# Patient Record
Sex: MALE | Race: WHITE | NOT HISPANIC OR LATINO | Employment: FULL TIME | URBAN - METROPOLITAN AREA
[De-identification: names, ages, dates, MRNs, and addresses within clinical notes are randomized per-mention and may not be internally consistent; named-entity substitution may affect disease eponyms.]

---

## 2020-06-09 DIAGNOSIS — R97.20 ELEVATED PROSTATE SPECIFIC ANTIGEN (PSA): Primary | ICD-10-CM

## 2020-06-11 ENCOUNTER — TELEPHONE (OUTPATIENT)
Dept: UROLOGY | Facility: CLINIC | Age: 65
End: 2020-06-11

## 2020-06-11 NOTE — TELEPHONE ENCOUNTER
----- Message from Janet Hoffmann sent at 6/11/2020  3:47 PM CDT -----  Contact: Patient   Patient has an appointment in the morning and need to speak to nurse regarding excuse for job. Call back number [phone]. Tks

## 2020-06-12 ENCOUNTER — OFFICE VISIT (OUTPATIENT)
Dept: UROLOGY | Facility: CLINIC | Age: 65
End: 2020-06-12
Payer: COMMERCIAL

## 2020-06-12 VITALS
HEIGHT: 70 IN | WEIGHT: 165 LBS | SYSTOLIC BLOOD PRESSURE: 167 MMHG | HEART RATE: 62 BPM | BODY MASS INDEX: 23.62 KG/M2 | RESPIRATION RATE: 18 BRPM | DIASTOLIC BLOOD PRESSURE: 72 MMHG

## 2020-06-12 DIAGNOSIS — R97.20 ELEVATED PROSTATE SPECIFIC ANTIGEN (PSA): Primary | ICD-10-CM

## 2020-06-12 LAB
BILIRUB SERPL-MCNC: NORMAL MG/DL
BLOOD URINE, POC: NORMAL
CLARITY, POC UA: CLEAR
COLOR, POC UA: YELLOW
GLUCOSE UR QL STRIP: NORMAL
KETONES UR QL STRIP: NORMAL
LEUKOCYTE ESTERASE URINE, POC: NORMAL
NITRITE, POC UA: NORMAL
PH, POC UA: 5.5
POC RESIDUAL URINE VOLUME: 10 ML (ref 0–100)
PROTEIN, POC: NORMAL
SPECIFIC GRAVITY, POC UA: 1.01
TESTOST SERPL-MCNC: 547 NG/DL (ref 193–740)
UROBILINOGEN, POC UA: 0.2

## 2020-06-12 PROCEDURE — 81002 URINALYSIS NONAUTO W/O SCOPE: CPT | Mod: S$GLB,,, | Performed by: UROLOGY

## 2020-06-12 PROCEDURE — 51798 US URINE CAPACITY MEASURE: CPT | Mod: S$GLB,,, | Performed by: UROLOGY

## 2020-06-12 PROCEDURE — 3008F PR BODY MASS INDEX (BMI) DOCUMENTED: ICD-10-PCS | Mod: CPTII,S$GLB,, | Performed by: UROLOGY

## 2020-06-12 PROCEDURE — 81002 POCT URINE DIPSTICK WITHOUT MICROSCOPE: ICD-10-PCS | Mod: S$GLB,,, | Performed by: UROLOGY

## 2020-06-12 PROCEDURE — 51798 POCT BLADDER SCAN: ICD-10-PCS | Mod: S$GLB,,, | Performed by: UROLOGY

## 2020-06-12 PROCEDURE — 99204 OFFICE O/P NEW MOD 45 MIN: CPT | Mod: 25,S$GLB,, | Performed by: UROLOGY

## 2020-06-12 PROCEDURE — 3008F BODY MASS INDEX DOCD: CPT | Mod: CPTII,S$GLB,, | Performed by: UROLOGY

## 2020-06-12 PROCEDURE — 99204 PR OFFICE/OUTPT VISIT, NEW, LEVL IV, 45-59 MIN: ICD-10-PCS | Mod: 25,S$GLB,, | Performed by: UROLOGY

## 2020-06-12 RX ORDER — ASPIRIN 81 MG/1
81 TABLET ORAL DAILY
COMMUNITY

## 2020-06-12 RX ORDER — LOSARTAN POTASSIUM AND HYDROCHLOROTHIAZIDE 12.5; 5 MG/1; MG/1
TABLET ORAL
COMMUNITY
End: 2023-06-27

## 2020-06-12 RX ORDER — BISOPROLOL FUMARATE 5 MG/1
TABLET, FILM COATED ORAL
COMMUNITY
Start: 2020-04-02

## 2020-06-12 RX ORDER — ATORVASTATIN CALCIUM 80 MG/1
TABLET, FILM COATED ORAL
COMMUNITY
Start: 2020-04-17

## 2020-06-12 RX ORDER — METFORMIN HYDROCHLORIDE 500 MG/1
TABLET, EXTENDED RELEASE ORAL
COMMUNITY
Start: 2020-04-03

## 2020-06-12 NOTE — PROGRESS NOTES
Subjective:       Patient ID: Jose Zuleta is a 65 y.o. male.    Chief Complaint: New Pt and Elevated PSA (PSA (05-21-20): 14.50)      HPI: 65-year-old male, former patient of Dr. Cote, last seen 3+ years ago.  Patient referred by Dr. Harris for an elevated PSA.  PSA was done on 05/21/2020 and was 14.50.  Patient states he has had an elevated PSA before.  States he had a biopsy in 2016 with Dr. Roldan at [facility].  Patient states that biopsy was negative.  Patient thinks his previous PSAs were 7.    Patient denies pain or burning urination.  States he has a good stream.  Denies difficulty voiding.  Denies blood in his urine.  Denies any significant weight loss.  Denies any bone pain.  Patient does state he will have rare occasions of nocturia.    No other urinary complaints.  All other health problems appear stable at this time.         Past Medical History:   Past Medical History:   Diagnosis Date    CAD (coronary artery disease)     Chronic pancreatitis     COPD (chronic obstructive pulmonary disease)     Diverticulosis 07/2015    DM (diabetes mellitus)     Elevated PSA     GERD (gastroesophageal reflux disease)     HTN (hypertension)     Hyperlipemia     MI (myocardial infarction)        Past Surgical Historical:   Past Surgical History:   Procedure Laterality Date    CHOLECYSTECTOMY      Heart Stents      x4    HERNIA REPAIR      PANCREAS SURGERY      PROSTATE BIOPSY  2016    SHOULDER SURGERY Left         Medications:   Medication List with Changes/Refills   Current Medications    ASPIRIN (ECOTRIN) 81 MG EC TABLET    Take 81 mg by mouth once daily.    ATORVASTATIN (LIPITOR) 80 MG TABLET        BISOPROLOL (ZEBETA) 5 MG TABLET        LOSARTAN-HYDROCHLOROTHIAZIDE 50-12.5 MG (HYZAAR) 50-12.5 MG PER TABLET    losartan 50 mg-hydrochlorothiazide 12.5 mg tablet    METFORMIN (GLUCOPHAGE-XR) 500 MG XR 24HR TABLET        OMEPRAZOLE MAGNESIUM (PRILOSEC ORAL)    Take by mouth.        Past Social History:    Social History     Socioeconomic History    Marital status: Single     Spouse name: Not on file    Number of children: Not on file    Years of education: Not on file    Highest education level: Not on file   Occupational History    Not on file   Social Needs    Financial resource strain: Not on file    Food insecurity:     Worry: Not on file     Inability: Not on file    Transportation needs:     Medical: Not on file     Non-medical: Not on file   Tobacco Use    Smoking status: Current Every Day Smoker   Substance and Sexual Activity    Alcohol use: Yes    Drug use: Not on file    Sexual activity: Not on file   Lifestyle    Physical activity:     Days per week: Not on file     Minutes per session: Not on file    Stress: Not on file   Relationships    Social connections:     Talks on phone: Not on file     Gets together: Not on file     Attends Christianity service: Not on file     Active member of club or organization: Not on file     Attends meetings of clubs or organizations: Not on file     Relationship status: Not on file   Other Topics Concern    Not on file   Social History Narrative    Not on file       Allergies: Review of patient's allergies indicates:  No Known Allergies     Family History:   Family History   Problem Relation Age of Onset    Coronary artery disease Other         Review of Systems:  Review of Systems   Constitutional: Negative for activity change and appetite change.   HENT: Negative for congestion and dental problem.    Eyes: Negative for visual disturbance.   Respiratory: Negative for chest tightness and shortness of breath.    Cardiovascular: Negative for chest pain.   Gastrointestinal: Negative for abdominal distention and abdominal pain.   Genitourinary: Negative for decreased urine volume, difficulty urinating, discharge, dysuria, enuresis, flank pain, frequency, genital sores, hematuria, penile pain, penile swelling, scrotal swelling, testicular pain and urgency.    Musculoskeletal: Negative for back pain and neck pain.   Skin: Negative for color change.   Neurological: Negative for dizziness.   Hematological: Negative for adenopathy.   Psychiatric/Behavioral: Negative for agitation, behavioral problems and confusion.       Physical Exam:  Physical Exam   Nursing note and vitals reviewed.  Constitutional: He is oriented to person, place, and time. He appears well-developed and well-nourished.   HENT:   Head: Normocephalic.   Eyes: Pupils are equal, round, and reactive to light.   Neck: Normal range of motion. Neck supple.   Cardiovascular: Normal rate, regular rhythm and normal heart sounds.    Pulmonary/Chest: Effort normal and breath sounds normal.   Abdominal: Soft. Bowel sounds are normal.   Genitourinary: Rectum normal, prostate normal and penis normal.   Genitourinary Comments: Prostate is benign.  Prostate is smooth with no nodules and nontender.   Musculoskeletal: Normal range of motion.   Neurological: He is alert and oriented to person, place, and time.   Skin: Skin is warm and dry.     Psychiatric: He has a normal mood and affect. His behavior is normal.     Urinalysis:  Glucose 500, otherwise normal.  Bladder scan:  10 cc    Assessment/Plan:   1.  Elevated PSA:  Recurrent check the patient's PSA and percent free.  Check his testosterone.  We will notify him of the results.  Will schedule patient for a prostate biopsy.    Follow-up to be arranged.    Problem List Items Addressed This Visit     None      Visit Diagnoses     Elevated prostate specific antigen (PSA)        Relevant Orders    POCT Bladder Scan (Completed)    PSA, total and free    POCT urine dipstick without microscope

## 2020-06-16 ENCOUNTER — CLINICAL SUPPORT (OUTPATIENT)
Dept: UROLOGY | Facility: CLINIC | Age: 65
End: 2020-06-16
Payer: COMMERCIAL

## 2020-06-16 DIAGNOSIS — E29.1 HYPOGONADISM IN MALE: Primary | ICD-10-CM

## 2020-06-16 NOTE — PROGRESS NOTES
Pt educated, consents signed. cipro 500 mg #8 given with instructions. Pt verbalized understanding.

## 2020-06-16 NOTE — LETTER
June 16, 2020      [facility]  [address] [address]  Phone: [phone]  Fax: [phone]       Patient: Jose Zuleta   YOB: 1955  Date of Visit: 06/16/2020    To Whom It May Concern:    Taiwo Zuleta  was at [facility] on 06/16/2020. HE may return to work  on 6/16/2020 WITH NO restrictions. If you have any questions or concerns, or if I can be of further assistance, please do not hesitate to contact me.    Sincerely,    David Hussein LPN

## 2020-06-16 NOTE — LETTER
June 16, 2020      [facility]  [address] [address]  Phone: [phone]  Fax: [phone]       Patient: Jose Zuleta   YOB: 1955  Date of Visit: 06/16/2020    To Whom It May Concern:    Taiwo Zuleta  was at [facility] on 06/30/2020. HE is having a procedure that will require him to take 4 days off after his procedure on 06/30/2020. He may return to work on 07/05/2020 with no restrictions. If you have any questions or concerns, or if I can be of further assistance, please do not hesitate to contact me.    Sincerely,    David Hussein LPN

## 2020-06-17 LAB
PROSTATE SPECIFIC ANTIGEN, TOTAL: 21.1 NG/ML
PSA FREE MFR SERPL: 4 % (CALC)
PSA FREE SERPL-MCNC: 0.8 NG/ML

## 2020-06-19 ENCOUNTER — TELEPHONE (OUTPATIENT)
Dept: UROLOGY | Facility: CLINIC | Age: 65
End: 2020-06-19

## 2020-06-19 NOTE — TELEPHONE ENCOUNTER
----- Message from Medinajaci Wise sent at 6/19/2020  2:48 PM CDT -----  Regarding: pt  Pt called and would like to consult a nurse regarding his doctor excuse for surgery. Pt would like for  EVA Tavarez to correct the dates. Please call back for [phone]

## 2020-06-30 ENCOUNTER — PROCEDURE VISIT (OUTPATIENT)
Dept: UROLOGY | Facility: CLINIC | Age: 65
End: 2020-06-30
Payer: COMMERCIAL

## 2020-06-30 VITALS
RESPIRATION RATE: 18 BRPM | OXYGEN SATURATION: 99 % | BODY MASS INDEX: 25.27 KG/M2 | WEIGHT: 161 LBS | HEART RATE: 64 BPM | DIASTOLIC BLOOD PRESSURE: 76 MMHG | HEIGHT: 67 IN | SYSTOLIC BLOOD PRESSURE: 181 MMHG

## 2020-06-30 DIAGNOSIS — R97.20 ELEVATED PROSTATE SPECIFIC ANTIGEN (PSA): ICD-10-CM

## 2020-06-30 PROCEDURE — 76872 US TRANSRECTAL: CPT | Mod: S$GLB,,, | Performed by: UROLOGY

## 2020-06-30 PROCEDURE — 55700 TRUS: CPT | Mod: S$GLB,,, | Performed by: UROLOGY

## 2020-06-30 PROCEDURE — 76872 TRUS: ICD-10-PCS | Mod: S$GLB,,, | Performed by: UROLOGY

## 2020-06-30 PROCEDURE — 96372 THER/PROPH/DIAG INJ SC/IM: CPT | Mod: 59,S$GLB,, | Performed by: UROLOGY

## 2020-06-30 PROCEDURE — 55700 TRUS: ICD-10-PCS | Mod: S$GLB,,, | Performed by: UROLOGY

## 2020-06-30 PROCEDURE — 96372 PR INJECTION,THERAP/PROPH/DIAG2ST, IM OR SUBCUT: ICD-10-PCS | Mod: 59,S$GLB,, | Performed by: UROLOGY

## 2020-06-30 RX ORDER — DIAZEPAM 10 MG/1
10 TABLET ORAL
Status: COMPLETED | OUTPATIENT
Start: 2020-06-30 | End: 2020-06-30

## 2020-06-30 RX ORDER — GENTAMICIN SULFATE 40 MG/ML
80 INJECTION, SOLUTION INTRAMUSCULAR; INTRAVENOUS
Status: COMPLETED | OUTPATIENT
Start: 2020-06-30 | End: 2020-06-30

## 2020-06-30 RX ADMIN — GENTAMICIN SULFATE 80 MG: 40 INJECTION, SOLUTION INTRAMUSCULAR; INTRAVENOUS at 10:06

## 2020-06-30 RX ADMIN — DIAZEPAM 10 MG: 10 TABLET ORAL at 10:06

## 2020-06-30 NOTE — PROCEDURES
"TRUS    Date/Time: 6/30/2020 10:00 AM  Performed by: Hugh Cote MD  Authorized by: Hugh Cote MD     Consent Done?:  Yes (Written)  Time out: Immediately prior to procedure a "time out" was called to verify the correct patient, procedure, equipment, support staff and site/side marked as required.    Indications: Elevated PSA    Preparation: Patient was prepped and draped in usual sterile fashion    Position:  Left lateral  Anesthesia:  10cc's 1% Lidocaine  Patient sedated: Yes    Sedation:  Other  Prostate Size:  45  Total Biopsies:  12    Patient tolerance:  Patient tolerated the procedure well with no immediate complications     Pt given 10 milligrams po valium.  Pt did have some central calcifications.  Fu next week      "

## 2020-06-30 NOTE — PATIENT INSTRUCTIONS
Prostate Needle Biopsy    Prostate needle biopsy is a test to look for prostate cancer. During the test, a thin, hollow needle is used to take small samples of tissue from the prostate. The samples are then tested in a lab.  Getting ready for the procedure  Prepare as you have been told. In addition to the following:  · Tell your healthcare provider about all medicines you take. This includes herbs and other supplements. It also includes any blood thinners, such as warfarin, clopidogrel, or daily aspirin. You may need to stop taking some or all of them before the procedure.  · You may be told to use a laxative, enemas, or both before the biopsy. This is to empty the colon and rectum of stool. Follow the instructions you are given.  · Your healthcare provider may prescribe antibiotics before the procedure. If so, take these as directed.  Risks and possible complications   All procedures have risks. The risks of this procedure include:  · Discomfort in the prostate area  · Infection in the urinary tract or prostate  · Infection in the bloodstream  · Rectal or urinary bleeding   The day of the procedure  The procedure is done in a healthcare providers office or a hospital. It takes about 45 minutes. You will be able to go home the same day. Transrectal ultrasound is often used during the procedure. This test uses sound waves to make images on a computer screen. The images help the healthcare provider insert the needle in the correct place. During the biopsy:  · If ultrasound will be used, you may be asked to drink water to fill your bladder.    · You may lie on your side on an exam table.   · The ultrasound transducer, which is about the size of a finger, is lubricated. It is then inserted into the rectum. This will feel like a prostate exam. The transducer is moved until the prostate can be seen in the ultrasound images.    · To numb the biopsy area, local anesthetics may be injected. You might also be given  medicine to make you sleepy.  · Using the ultrasound images as a guide, the biopsy needle is inserted. It may be inserted through the rectum or through the skin between the scrotum and the anus (perineum).  · The needle is used to take tissue samples from the prostate. About 12 samples are taken from different areas of the prostate. These samples are sent to a lab to be tested for cancer.  After the biopsy  At first you may feel a little lightheaded, especially if you had medicine to make you sleepy. You can lie on the table until you feel able to stand. You can go home once you are feeling better. You can go back to your normal activities. You may have some blood in your urine or stool that day. This is normal. You may also notice blood in your semen for weeks after the biopsy. This is normal and not dangerous. Your healthcare provider can tell you more about what to expect.  Follow-up care  You will see your healthcare provider for a follow-up visit. Depending on the biopsy results, you may be scheduled for more tests. If signs of cancer are found, you and your healthcare provider can discuss options for further testing.  When to call your healthcare provider  Call your healthcare provider if you have any of the following:  · Blood clots in your urine  · Bloody diarrhea  · Blood in the urine or stool that doesnt go away after 48 hours  · Chest pain or trouble breathing (call 911)  · Chills  · Feeling of weakness  · Fever of 100.4°F (38°C) or higher, or as directed by your healthcare provider  · Inability to urinate   Date Last Reviewed: 5/1/2017 © 2000-2017 [employer]. [address] [place] [id number]. All rights reserved. This information is not intended as a substitute for professional medical care. Always follow your healthcare professional's instructions.        Prostate Bx D/C  After hours or on weekends, you may reach a urology resident on call at this number: [phone].  NO  STRENUOUS ACTIVITY FOR 3 DAYS  NO SEXUAL INTERCOURSE FOR 7 DAYS  YOU MAY NOTICE BLOOD IN URINE OR SEMEN FOR SEVERAL DAYS

## 2020-07-10 ENCOUNTER — OFFICE VISIT (OUTPATIENT)
Dept: UROLOGY | Facility: CLINIC | Age: 65
End: 2020-07-10
Payer: COMMERCIAL

## 2020-07-10 VITALS
HEART RATE: 65 BPM | HEIGHT: 70 IN | BODY MASS INDEX: 23.62 KG/M2 | RESPIRATION RATE: 18 BRPM | SYSTOLIC BLOOD PRESSURE: 160 MMHG | DIASTOLIC BLOOD PRESSURE: 72 MMHG | WEIGHT: 165 LBS

## 2020-07-10 DIAGNOSIS — C61 PROSTATE CANCER: ICD-10-CM

## 2020-07-10 DIAGNOSIS — Z98.890 STATUS POST BIOPSY: Primary | ICD-10-CM

## 2020-07-10 LAB
BILIRUB UR QL STRIP: NEGATIVE
CLARITY, POC UA: ABNORMAL
COLOR, POC UA: ABNORMAL
GLUCOSE UR QL STRIP: POSITIVE
KETONES UR QL STRIP: NEGATIVE
LEUKOCYTE ESTERASE UR QL STRIP: NEGATIVE
NITRITE, POC UA: ABNORMAL
PH, POC UA: 6
POC AMORP, URINE: ABNORMAL
POC BACTI, URINE: ABNORMAL
POC BLOOD, URINE: POSITIVE
POC CASTS, URINE: ABNORMAL
POC CRYST, URINE: ABNORMAL
POC EPITH, URINE: ABNORMAL
POC HCG, URINE: ABNORMAL
POC HYALIN, URINE: ABNORMAL LPF
POC MUCUS, URINE: ABNORMAL
POC NITRATES, URINE: NEGATIVE
POC OTHER, URINE: ABNORMAL
POC RBC, URINE: ABNORMAL HPF
POC WBC, URINE: ABNORMAL HPF
PROT UR QL STRIP: NEGATIVE
SP GR UR STRIP: <=1.005 (ref 1–1.03)
UROBILINOGEN UR STRIP-ACNC: 0.2 (ref 0.3–2.2)

## 2020-07-10 PROCEDURE — 99213 OFFICE O/P EST LOW 20 MIN: CPT | Mod: S$GLB,,, | Performed by: NURSE PRACTITIONER

## 2020-07-10 PROCEDURE — 3008F BODY MASS INDEX DOCD: CPT | Mod: CPTII,S$GLB,, | Performed by: NURSE PRACTITIONER

## 2020-07-10 PROCEDURE — 3008F PR BODY MASS INDEX (BMI) DOCUMENTED: ICD-10-PCS | Mod: CPTII,S$GLB,, | Performed by: NURSE PRACTITIONER

## 2020-07-10 PROCEDURE — 99213 PR OFFICE/OUTPT VISIT, EST, LEVL III, 20-29 MIN: ICD-10-PCS | Mod: S$GLB,,, | Performed by: NURSE PRACTITIONER

## 2020-07-10 NOTE — PROGRESS NOTES
Subjective:       Patient ID: Jose Zuleta is a 65 y.o. male.    Chief Complaint: Prostate Bx f/u      HPI: 65-year-old male, patient Dr. Cote, presents for biopsy results.  Patient had a prostate biopsy on 06/30/2020, secondary to a PSA of 21.1.  ANA was benign.  Patient had 2/12 biopsies come back positive for adenocarcinoma.  1.  Lamar score 4+3=7, tumor size 8 mm involving 62% of the length of the biopsy.  2.  Lamar score 4+3=7, tumor size 3 mm involving 27% of the length of the biopsy.    Patient states he is doing well at this time.  Denies any pain or burning urination.  Denies any blood in his urine.  Denies any difficulty voiding.  Denies any perineal pain.  Denies any fever.    No other urinary complaints.  All other health problems appear stable at this time.       Past Medical History:   Past Medical History:   Diagnosis Date    CAD (coronary artery disease)     Chronic pancreatitis     COPD (chronic obstructive pulmonary disease)     Diverticulosis 07/2015    DM (diabetes mellitus)     Elevated PSA     GERD (gastroesophageal reflux disease)     HTN (hypertension)     Hyperlipemia     MI (myocardial infarction)        Past Surgical Historical:   Past Surgical History:   Procedure Laterality Date    CHOLECYSTECTOMY      Heart Stents      x4    HERNIA REPAIR      PANCREAS SURGERY      PROSTATE BIOPSY  2016    PROSTATE BIOPSY  06/30/2020    SHOULDER SURGERY Left         Medications:   Medication List with Changes/Refills   Current Medications    ASPIRIN (ECOTRIN) 81 MG EC TABLET    Take 81 mg by mouth once daily.    ATORVASTATIN (LIPITOR) 80 MG TABLET        BISOPROLOL (ZEBETA) 5 MG TABLET        LOSARTAN-HYDROCHLOROTHIAZIDE 50-12.5 MG (HYZAAR) 50-12.5 MG PER TABLET    losartan 50 mg-hydrochlorothiazide 12.5 mg tablet    METFORMIN (GLUCOPHAGE-XR) 500 MG XR 24HR TABLET        OMEPRAZOLE MAGNESIUM (PRILOSEC ORAL)    Take by mouth.        Past Social History:   Social History      Socioeconomic History    Marital status: Single     Spouse name: Not on file    Number of children: Not on file    Years of education: Not on file    Highest education level: Not on file   Occupational History    Not on file   Social Needs    Financial resource strain: Not on file    Food insecurity     Worry: Not on file     Inability: Not on file    Transportation needs     Medical: Not on file     Non-medical: Not on file   Tobacco Use    Smoking status: Current Every Day Smoker   Substance and Sexual Activity    Alcohol use: Yes    Drug use: Not on file    Sexual activity: Not on file   Lifestyle    Physical activity     Days per week: Not on file     Minutes per session: Not on file    Stress: Not on file   Relationships    Social connections     Talks on phone: Not on file     Gets together: Not on file     Attends Ep[employer]opalian service: Not on file     Active member of club or organization: Not on file     Attends meetings of clubs or organizations: Not on file     Relationship status: Not on file   Other Topics Concern    Not on file   Social History Narrative    Not on file       Allergies: Review of patient's allergies indicates:  No Known Allergies     Family History:   Family History   Problem Relation Age of Onset    Coronary artery disease Other         Review of Systems:  Review of Systems   Constitutional: Negative for activity change and appetite change.   HENT: Negative for congestion and dental problem.    Respiratory: Negative for chest tightness and shortness of breath.    Cardiovascular: Negative for chest pain.   Gastrointestinal: Negative for abdominal distention and abdominal pain.   Genitourinary: Negative for decreased urine volume, difficulty urinating, discharge, dysuria, enuresis, flank pain, frequency, genital sores, hematuria, penile pain, penile swelling, scrotal swelling, testicular pain and urgency.   Musculoskeletal: Negative for back pain and neck pain.    Neurological: Negative for dizziness.   Hematological: Negative for adenopathy.   Psychiatric/Behavioral: Negative for agitation, behavioral problems and confusion.       Physical Exam:  Physical Exam   Nursing note and vitals reviewed.  Constitutional: He is oriented to person, place, and time. He appears well-developed.   HENT:   Head: Normocephalic.   Cardiovascular: Normal rate, regular rhythm and normal heart sounds.    Pulmonary/Chest: Effort normal and breath sounds normal.   Abdominal: Soft. Bowel sounds are normal.   Neurological: He is alert and oriented to person, place, and time.   Skin: Skin is warm and dry.      Urinalysis:  Moderate blood, red blood cells 6-10.    Assessment/Plan:   Prostate cancer:  The patient newly diagnosed with prostate cancer.  Of explain results of the biopsy with the patient.  Patient stated understanding.    I briefly discussed options with the patient.  However, patient will follow-up with Dr. Bishop to further discuss options.    Patient is a little stressed with these results.  We will provide him a work excuse for the rest of the day.  Patient will return to work on Monday.    Patient will follow up with Dr. mcbride 1 a on 06/20/2020.  Follow-up sooner if needed.  Problem List Items Addressed This Visit        Oncology    Prostate cancer    Overview     Biopsy 06/30/2020 d/t PSA 21.1  2/12 +adenocarcinoma.  1 - GS4+3=7 8mm, 62%  2 - GS4+3=7 3mm, 27%           Current Assessment & Plan     Discussed results with patient and states understanding.  Will arrange consultation with Dr. Bishop to further discuss options.            Other Visit Diagnoses     Status post biopsy    -  Primary    Relevant Orders    POCT Urinalysis (w/Micro Option)

## 2020-07-10 NOTE — ASSESSMENT & PLAN NOTE
Discussed results with patient and states understanding.  Will arrange consultation with Dr. Bishop to further discuss options.

## 2020-07-10 NOTE — LETTER
July 10, 2020      [facility]  [address] [address]  Phone: [phone]  Fax: [phone]       Patient: Jose Zuleta   YOB: 1955  Date of Visit: 07/10/2020    To Whom It May Concern:    Taiwo Zuleta  was at [facility] on 07/10/2020. HE may return to work 07/13/20. If you have any questions or concerns, or if I can be of further assistance, please do not hesitate to contact me.    Sincerely,    Shefali Savage LPN

## 2020-07-20 ENCOUNTER — OFFICE VISIT (OUTPATIENT)
Dept: UROLOGY | Facility: CLINIC | Age: 65
End: 2020-07-20
Payer: COMMERCIAL

## 2020-07-20 VITALS
DIASTOLIC BLOOD PRESSURE: 77 MMHG | HEART RATE: 68 BPM | RESPIRATION RATE: 18 BRPM | SYSTOLIC BLOOD PRESSURE: 167 MMHG | OXYGEN SATURATION: 97 % | TEMPERATURE: 98 F

## 2020-07-20 DIAGNOSIS — C61 PROSTATE CANCER: Primary | ICD-10-CM

## 2020-07-20 PROCEDURE — 99214 PR OFFICE/OUTPT VISIT, EST, LEVL IV, 30-39 MIN: ICD-10-PCS | Mod: S$GLB,,, | Performed by: SPECIALIST

## 2020-07-20 PROCEDURE — 99214 OFFICE O/P EST MOD 30 MIN: CPT | Mod: S$GLB,,, | Performed by: SPECIALIST

## 2020-07-20 NOTE — PROGRESS NOTES
Subjective:       Patient ID: Jose Zuleta is a 65 y.o. male.    Chief Complaint: No chief complaint on file.      HPI:  65-year-old man referred to us by Dr. Hugh Cote for newly diagnosed prostate cancer.  He had undergone a needle biopsy of the prostate on 06/30/2020 which showed ISUP grade group 3 in 2 cores on the left side.  The left lateral base was a [place] 4+3=7 8 mm focus 62% of the core the left lateral mid was Harsh 4+3=7 3 mm focus 27% of the core.  He was sent to us for consideration for robotic prostatectomy.  The initial PSA was 21.1 ng/mL    He lost his wife about 27 years ago, but is able to have erections when he needs to.  He reports that he wakes up with erections in the morning.    Past Medical History:   Past Medical History:   Diagnosis Date    CAD (coronary artery disease)     Chronic pancreatitis     COPD (chronic obstructive pulmonary disease)     Diverticulosis 07/2015    DM (diabetes mellitus)     Elevated PSA     GERD (gastroesophageal reflux disease)     HTN (hypertension)     Hyperlipemia     MI (myocardial infarction)        Past Surgical Historical:   Past Surgical History:   Procedure Laterality Date    CHOLECYSTECTOMY      Heart Stents      x4    HERNIA REPAIR      PANCREAS SURGERY      PROSTATE BIOPSY  2016    PROSTATE BIOPSY  06/30/2020    SHOULDER SURGERY Left         Medications:   Medication List with Changes/Refills   Current Medications    ASPIRIN (ECOTRIN) 81 MG EC TABLET    Take 81 mg by mouth once daily.    ATORVASTATIN (LIPITOR) 80 MG TABLET        BISOPROLOL (ZEBETA) 5 MG TABLET        LOSARTAN-HYDROCHLOROTHIAZIDE 50-12.5 MG (HYZAAR) 50-12.5 MG PER TABLET    losartan 50 mg-hydrochlorothiazide 12.5 mg tablet    METFORMIN (GLUCOPHAGE-XR) 500 MG XR 24HR TABLET        OMEPRAZOLE MAGNESIUM (PRILOSEC ORAL)    Take by mouth.        Past Social History:   Social History     Socioeconomic History    Marital status: Single     Spouse name: Not on  file    Number of children: Not on file    Years of education: Not on file    Highest education level: Not on file   Occupational History    Not on file   Social Needs    Financial resource strain: Not on file    Food insecurity     Worry: Not on file     Inability: Not on file    Transportation needs     Medical: Not on file     Non-medical: Not on file   Tobacco Use    Smoking status: Current Every Day Smoker   Substance and Sexual Activity    Alcohol use: Yes    Drug use: Not on file    Sexual activity: Not on file   Lifestyle    Physical activity     Days per week: Not on file     Minutes per session: Not on file    Stress: Not on file   Relationships    Social connections     Talks on phone: Not on file     Gets together: Not on file     Attends Evangelical service: Not on file     Active member of club or organization: Not on file     Attends meetings of clubs or organizations: Not on file     Relationship status: Not on file   Other Topics Concern    Not on file   Social History Narrative    Not on file       Allergies: Review of patient's allergies indicates:  No Known Allergies     Family History:   Family History   Problem Relation Age of Onset    Coronary artery disease Other         Review of Systems:  Review of Systems - General ROS: negative  Psychological ROS: negative  Ophthalmic ROS: negative  ENT ROS: negative  Allergy and Immunology ROS: negative  Hematological and Lymphatic ROS: negative  Endocrine ROS: negative  Respiratory ROS: no cough, shortness of breath, or wheezing  Cardiovascular ROS: no chest pain or dyspnea on exertion  Gastrointestinal ROS: no abdominal pain, change in bowel habits, or black or bloody stools  Genito-Urinary ROS: positive for - newly diagnosed prostate cancer  Musculoskeletal ROS: negative  Neurological ROS: no TIA or stroke symptoms  Dermatological ROS: negative     Physical Exam:  General Appearance:    Alert, cooperative, no distress, appears stated  age   Head:    Normocephalic, without obvious abnormality, atraumatic   Eyes:    PERRL, conjunctiva/corneas clear, EOM's intact, fundi     benign, both eyes        Ears:    Normal TM's and external ear canals, both ears   Nose:   Nares normal, septum midline, mucosa normal, no drainage    or sinus tenderness   Throat:   Lips, mucosa, and tongue normal; teeth and gums normal   Neck:   Supple, symmetrical, trachea midline, no adenopathy;        thyroid:  No enlargement/tenderness/nodules; no carotid    bruit or JVD   Back:     Symmetric, no curvature, ROM normal, no CVA tenderness   Lungs:     Clear to auscultation bilaterally, respirations unlabored   Chest wall:    No tenderness or deformity   Heart:    Regular rate and rhythm, S1 and S2 normal, no murmur, rub   or gallop   Abdomen:     Soft, non-tender, bowel sounds active all four quadrants,     no masses, no organomegaly   Genitalia:    Deferred   Rectal:    Deferred   Extremities:   Extremities normal, atraumatic, no cyanosis or edema   Pulses:   2+ and symmetric all extremities   Skin:   Skin color, texture, turgor normal, no rashes or lesions   Lymph nodes:   Cervical, supraclavicular, and axillary nodes normal   Neurologic:   CNII-XII intact. Normal strength, sensation and reflexes       throughout       Assessment/Plan:       65-year-old man with newly diagnosed ISUP grade group 3 adenocarcinoma of the prostate in 2 cores significant volume disease.    1. We talked about the natural progression of prostate cancer today.  We talked about treatment modalities for prostate cancer.  Initially we explained the concept of active surveillance and what is entailed in terms of follow-up regimen and the factors that would trigger moving on to treatment.  Based on patient's risk stratification, and he severely elevated PSA I will not recommend active surveillance.  Then we discussed treatment with curative intent including radiotherapy in a variety of external beam  versus brachytherapy.  Then we talked about robotic prostatectomy as the standard of care for surgical therapy.  There was brief mention of high intensity focused ultrasound as well as cryotherapy.  At this point patient is leaning more to was a treatment modality with curative intent either radiation or prostatectomy.  He is leaning towards robotic prostatectomy but patient will definitely discussed with his family and then give me a call with his final decision.  2.  Patient has not had any additional metastatic evaluation.  Given that his initial PSA was 21.1 ng/mL he will need a bone scan and a CT scan of the abdomen and pelvis for complete evaluation.      Problem List Items Addressed This Visit        Oncology    Prostate cancer - Primary    Overview     Biopsy 06/30/2020 d/t PSA 21.1  2/12 +adenocarcinoma.  1 - GS4+3=7 8mm, 62%  2 - GS4+3=7 3mm, 27%

## 2020-07-20 NOTE — PROGRESS NOTES
Pt without complaints at this time. Consult appt per JJJ. Elevated PSA positive biopsy for cancer.

## 2020-07-26 DIAGNOSIS — Z01.818 PREOPERATIVE TESTING: Primary | ICD-10-CM

## 2020-07-27 ENCOUNTER — TELEPHONE (OUTPATIENT)
Dept: UROLOGY | Facility: CLINIC | Age: 65
End: 2020-07-27

## 2020-07-27 DIAGNOSIS — C61 PROSTATE CANCER: Primary | ICD-10-CM

## 2020-07-27 LAB
ANION GAP SERPL CALC-SCNC: 9 MMOL/L (ref 8–17)
BUN/CREAT SERPL: 9 (ref 6–20)
CALCIUM SERPL-MCNC: 9 MG/DL (ref 8.6–10.2)
CARBON DIOXIDE, CO2: 30 MMOL/L (ref 22–29)
CHLORIDE: 88 MMOL/L (ref 98–107)
CREAT SERPL-MCNC: 0.8 MG/DL (ref 0.7–1.2)
GFR ESTIMATION: 97.64
GLUCOSE: 139 MG/DL (ref 82–115)
POTASSIUM: 3.4 MMOL/L (ref 3.5–5.1)
SODIUM: 127 MMOL/L (ref 136–145)
UREA NITROGEN (BUN): 7.2 MG/DL (ref 8–23)

## 2020-07-27 NOTE — TELEPHONE ENCOUNTER
Please communicate the following information to Ms. annita Zuleta     1.  He needs to go to the path lab on Monday 07/27/2020 and get a BMP drawn.   2.  I am going to review the BMP and then I am going to order a CT scan of the abdomen and pelvis as well as a bone scan.  We need to stage him appropriately based on the risk stratification of his cancer   3.  After we reviewed all the studies that we going to pick a date for him for his surgery.  If all goes well with plan to put him on the schedule for mid August 2020       Updated patient on POC above, verbalized understanding

## 2020-07-27 NOTE — TELEPHONE ENCOUNTER
----- Message from Janet Hoffmann sent at 7/24/2020  4:42 PM CDT -----  patient need to speak to nurse regarding scheduling surgery. Call back number [phone]. Luc

## 2020-07-28 ENCOUNTER — TELEPHONE (OUTPATIENT)
Dept: UROLOGY | Facility: CLINIC | Age: 65
End: 2020-07-28

## 2020-07-28 NOTE — TELEPHONE ENCOUNTER
Pt contacted clinic, nurse advised pt about BMP is good and that STEVEN is going to order the CT scan and bone scan, pt verbalized understanding. NB

## 2020-07-28 NOTE — TELEPHONE ENCOUNTER
Attempted to contact pt. Left voicemail, adv'd pt that the BMP has been reviewed & renal function is very good. Also adv'd that Dr Bishop will order CT abdomen and pelvis as well as a bone scan.    ABW     ----- Message from Mohamud Bishop MD sent at 7/27/2020  6:46 PM CDT -----  Inform him that his BMP has been reviewed.  His renal function is very good.  I am going to go ahead and order a CT abdomen and pelvis as well as a bone scan.  I would like him to do these as soon as possible.

## 2020-08-12 DIAGNOSIS — C61 PROSTATE CANCER: Primary | ICD-10-CM

## 2020-08-13 ENCOUNTER — OFFICE VISIT (OUTPATIENT)
Dept: UROLOGY | Facility: CLINIC | Age: 65
End: 2020-08-13
Payer: COMMERCIAL

## 2020-08-13 VITALS
HEIGHT: 70 IN | BODY MASS INDEX: 23.62 KG/M2 | SYSTOLIC BLOOD PRESSURE: 194 MMHG | DIASTOLIC BLOOD PRESSURE: 88 MMHG | HEART RATE: 69 BPM | WEIGHT: 165 LBS

## 2020-08-13 DIAGNOSIS — C61 PROSTATE CANCER: Primary | ICD-10-CM

## 2020-08-13 PROCEDURE — 99213 PR OFFICE/OUTPT VISIT, EST, LEVL III, 20-29 MIN: ICD-10-PCS | Mod: S$GLB,,, | Performed by: SPECIALIST

## 2020-08-13 PROCEDURE — 3008F BODY MASS INDEX DOCD: CPT | Mod: CPTII,S$GLB,, | Performed by: SPECIALIST

## 2020-08-13 PROCEDURE — 3008F PR BODY MASS INDEX (BMI) DOCUMENTED: ICD-10-PCS | Mod: CPTII,S$GLB,, | Performed by: SPECIALIST

## 2020-08-13 PROCEDURE — 99213 OFFICE O/P EST LOW 20 MIN: CPT | Mod: S$GLB,,, | Performed by: SPECIALIST

## 2020-08-13 RX ORDER — AMOXICILLIN 875 MG/1
TABLET, FILM COATED ORAL
COMMUNITY
End: 2022-03-15

## 2020-08-13 NOTE — PROGRESS NOTES
Subjective:       Patient ID: Jose Zuleta is a 65 y.o. male.    Chief Complaint: Follow-up      HPI: 65-year-old man referred to us by Dr. Hugh Cote for newly diagnosed prostate cancer.  He had undergone a needle biopsy of the prostate on 06/30/2020 which showed ISUP grade group 3 in 2 cores on the left side.  The left lateral base was a Harsh 4+3=7 8 mm focus 62% of the core the left lateral mid was Harsh 4+3=7 3 mm focus 27% of the core.  He was sent to us for consideration for robotic prostatectomy.  The initial PSA was 21.1 ng/mL    We sent for metastatic evaluation.  CT scan abdomen and pelvis was negative.  Bone scan was suspicious for uptake on the left frontal bone.  He is here today to be set up for skull x-rays confirmed of text for this suspicious area of uptake on the bone scan.  He denies constitutional symptoms or weight loss.    Past Medical History:   Past Medical History:   Diagnosis Date    CAD (coronary artery disease)     Chronic pancreatitis     COPD (chronic obstructive pulmonary disease)     Diverticulosis 07/2015    DM (diabetes mellitus)     Elevated PSA     GERD (gastroesophageal reflux disease)     HTN (hypertension)     Hyperlipemia     MI (myocardial infarction)        Past Surgical Historical:   Past Surgical History:   Procedure Laterality Date    CHOLECYSTECTOMY      Heart Stents      x4    HERNIA REPAIR      PANCREAS SURGERY      PROSTATE BIOPSY  2016    PROSTATE BIOPSY  06/30/2020    SHOULDER SURGERY Left         Medications:   Medication List with Changes/Refills   Current Medications    AMOXICILLIN (AMOXIL) 875 MG TABLET    amoxicillin 875 mg tablet    ASPIRIN (ECOTRIN) 81 MG EC TABLET    Take 81 mg by mouth once daily.    ATORVASTATIN (LIPITOR) 80 MG TABLET        BISOPROLOL (ZEBETA) 5 MG TABLET        LOSARTAN-HYDROCHLOROTHIAZIDE 50-12.5 MG (HYZAAR) 50-12.5 MG PER TABLET    losartan 50 mg-hydrochlorothiazide 12.5 mg tablet    METFORMIN  (GLUCOPHAGE-XR) 500 MG XR 24HR TABLET        OMEPRAZOLE MAGNESIUM (PRILOSEC ORAL)    Take by mouth.        Past Social History:   Social History     Socioeconomic History    Marital status: Single     Spouse name: Not on file    Number of children: Not on file    Years of education: Not on file    Highest education level: Not on file   Occupational History    Not on file   Social Needs    Financial resource strain: Not on file    Food insecurity     Worry: Not on file     Inability: Not on file    Transportation needs     Medical: Not on file     Non-medical: Not on file   Tobacco Use    Smoking status: Current Every Day Smoker   Substance and Sexual Activity    Alcohol use: Yes    Drug use: Not on file    Sexual activity: Not on file   Lifestyle    Physical activity     Days per week: Not on file     Minutes per session: Not on file    Stress: Not on file   Relationships    Social connections     Talks on phone: Not on file     Gets together: Not on file     Attends Episcopal service: Not on file     Active member of club or organization: Not on file     Attends meetings of clubs or organizations: Not on file     Relationship status: Not on file   Other Topics Concern    Not on file   Social History Narrative    Not on file       Allergies: Review of patient's allergies indicates:  No Known Allergies     Family History:   Family History   Problem Relation Age of Onset    Coronary artery disease Other         Review of Systems:   systems reviewed and notable for prostate cancer  All other systems were reviewed Neg except as stated in the HPI    Physical Exam:  General: A&Ox3. No apparent distress. No deformities.  Neck: No masses. Normal thyroid.  Lungs: normal inspiration. No use of accessory muscles.  Heart: normal pulse. No arrhythmias.  Abdomen: Soft. NT. ND. No masses. No hernias. No hepatosplenomegaly.  Lymphatic: Neck and groin nodes negative.  Skin: The skin is warm and dry. No  jaundice.  Neurology: Cranial nerves 2-12 crossly intact, no focal weaknesses, no sensation deficits, no motor deficits  Ext: No clubbing, cyanosis or edema.  :  Deferred      Assessment/Plan:       65-year-old man with adenocarcinoma of the prostate ISUP grade group 3 in 2 cores all in the left lateral side with initial presenting PSA of 21.1 ng/mL.    1.  Bone scan was concerning for uptake in the left frontal bone.  We ordered an x-ray of this call four views limited today.  Patient will perform these films today.  2.  He is already on the schedule for robotic prostatectomy in the next 1-2 weeks.    Problem List Items Addressed This Visit        Oncology    Prostate cancer - Primary    Overview     Biopsy 06/30/2020 d/t PSA 21.1  2/12 +adenocarcinoma.  1 - GS4+3=7 8mm, 62%  2 - GS4+3=7 3mm, 27%           Relevant Orders    X-Ray Skull Ltd Less Than 4 Views (Completed)

## 2020-08-18 ENCOUNTER — TELEPHONE (OUTPATIENT)
Dept: UROLOGY | Facility: CLINIC | Age: 65
End: 2020-08-18

## 2020-08-18 NOTE — TELEPHONE ENCOUNTER
Pt contacted clinic and advised that he would not be cleared for surgery on 8/19/20. Pt states that he has to be cleared from cardiologist per Dr. Harris and he is cancelling his surgery. MOLLY Barragan's office contacted clinic and advised that it has been since 2017 that pt was seen and Dr. Barragan is not in clinic today to make a decision. MOLLY Harris's opffice states that he would not clear pt for surgery and prefers for pt to be cleared by cardiologist since pt has not been seen since having his stent's placed. Nurse verbalized understanding ans asked to fax office notes to clinic. MOLLY HARRIS to be notified of status.  and surgery prsonnel aware of cancellation. NB

## 2020-08-20 ENCOUNTER — TELEPHONE (OUTPATIENT)
Dept: UROLOGY | Facility: CLINIC | Age: 65
End: 2020-08-20

## 2020-08-20 NOTE — TELEPHONE ENCOUNTER
spoke with pt who stated he has a possible appt with Dr Barragan on Monday. instructed pt to bring his cardiac clearence here to the office.pt verbalized understanding.      By David Hussein LPN

## 2020-08-20 NOTE — TELEPHONE ENCOUNTER
----- Message from Janet Hoffmann sent at 8/19/2020  4:32 PM CDT -----  #patient need to speak to nurse before his surgery. Call back number [phone]. Luc

## 2020-09-09 ENCOUNTER — TELEPHONE (OUTPATIENT)
Dept: UROLOGY | Facility: CLINIC | Age: 65
End: 2020-09-09

## 2020-09-09 NOTE — TELEPHONE ENCOUNTER
Spoke with pt he states that he has not had his stress test and was inquiring about what his next steps would be, nurse advised that as of right now all procedures are on hold until further notice and once the clinic has been notified that we will reach out to him with an update status, pt verbalized understanding. NB        ----- Message from Ly Hunt sent at 9/2/2020  2:24 PM CDT -----  Regarding: pt. advice  Contact: self  Type:  Needs Medical Advice    Who Called: pt  Would the patient rather a call back or a response via [facility]? Call back  Best Call Back Number: [phone]  Additional Information: pt. Called to know when will he have a stress test before his procedure and/or if the procedure is rescheduled or will be. Pt. Really need to speak with staff regarding this

## 2020-09-25 ENCOUNTER — TELEPHONE (OUTPATIENT)
Dept: UROLOGY | Facility: CLINIC | Age: 65
End: 2020-09-25

## 2020-09-25 NOTE — TELEPHONE ENCOUNTER
Contacted pt advised him that he need to get with Cardiologist to have his Nuclear Stress Test done before proceeding with procedure. Pt verbalized understanding. BJP      ----- Message from Medina Wise sent at 9/24/2020  3:44 PM CDT -----  Regarding: pt  Pt would like to speak with Yanna. Pt has some concerns about the the surgery. Please call back at [phone]

## 2020-10-29 ENCOUNTER — TELEPHONE (OUTPATIENT)
Dept: UROLOGY | Facility: CLINIC | Age: 65
End: 2020-10-29

## 2020-10-29 NOTE — TELEPHONE ENCOUNTER
Spoke with pt about his education needed to be scheduled, he inquired about why would he need to have a repeat of labs done if he has already had them done previously, nurse advised that the results are only good for 30 days prior to having a procedure, pt inquired if he would have certain restrictions after his surgery and STEVEN spoke with him about this, pt verbalized understanding. NB

## 2020-11-17 ENCOUNTER — CLINICAL SUPPORT (OUTPATIENT)
Dept: UROLOGY | Facility: CLINIC | Age: 65
End: 2020-11-17
Payer: COMMERCIAL

## 2020-11-17 DIAGNOSIS — C61 PROSTATE CANCER: Primary | ICD-10-CM

## 2020-11-17 NOTE — PROGRESS NOTES
Pt in clinic today for pre-admission paperwork for RARP/BPLND/RNS, informed pt that consents will be signed at hospital day of procedure. Short/same day papers given, clearance has been sent, advised pt that [facility] will contact pt the day before surgery with the time to be at the hospital, pt informed of driving restrictions, lifting restrictions of nothing over 10 lbs, possibility of catheter placement, and when RTC appt will be scheduled, pt verbalized understanding. NB

## 2020-11-18 LAB
ANION GAP SERPL CALC-SCNC: 5 MMOL/L (ref 3–11)
APTT PPP: 30.7 SEC (ref 19.6–32.4)
BASOPHILS NFR BLD: 1.1 % (ref 0–3)
BUN SERPL-MCNC: 8 MG/DL (ref 7–18)
BUN/CREAT SERPL: 8.88 RATIO (ref 7–18)
CALCIUM BLD-MCNC: NEGATIVE MG/DL
CALCIUM SERPL-MCNC: 9.3 MG/DL (ref 8.8–10.5)
CHLORIDE SERPL-SCNC: 90 MMOL/L (ref 100–108)
CO2 SERPL-SCNC: 33 MMOL/L (ref 21–32)
COVID-19 AB, IGM: NEGATIVE
CREAT SERPL-MCNC: 0.9 MG/DL (ref 0.7–1.3)
EOSINOPHIL NFR BLD: 5.3 % (ref 1–3)
ERYTHROCYTE [DISTWIDTH] IN BLOOD BY AUTOMATED COUNT: 13.1 % (ref 12.5–18)
GFR ESTIMATION: > 60
GLUCOSE SERPL-MCNC: 159 MG/DL (ref 70–110)
HCT VFR BLD AUTO: 43.8 % (ref 42–52)
HGB BLD-MCNC: 15.5 G/DL (ref 14–18)
INR PPP: 1 INR (ref 0.9–1.1)
LYMPHOCYTES NFR BLD: 22.8 % (ref 25–40)
MCH RBC QN AUTO: 30.6 PG (ref 27–31.2)
MCHC RBC AUTO-ENTMCNC: 35.4 G/DL (ref 31.8–35.4)
MCV RBC AUTO: 86.4 FL (ref 80–97)
MONOCYTES NFR BLD: 9 % (ref 1–15)
NEUTROPHILS # BLD AUTO: 4.66 10*3/UL (ref 1.8–7.7)
NEUTROPHILS NFR BLD: 61.4 % (ref 37–80)
NUCLEATED RED BLOOD CELLS: 0 %
PATIENT EMPLOYED IN HEALTHCARE: NO
PATIENT HAS SYMPTOMS FOR CONDITION OF INTEREST: NO
PATIENT HOSPITALIZED BC COND: NO
PATIENT IN CONGREGATE CARE: NO
PLATELETS: 220 10*3/UL (ref 142–424)
POTASSIUM SERPL-SCNC: 3.4 MMOL/L (ref 3.6–5.2)
PROTHROMBIN TIME: 12 SEC (ref 10.6–13)
RBC # BLD AUTO: 5.07 10*6/UL (ref 4.7–6.1)
SODIUM BLD-SCNC: 128 MMOL/L (ref 135–145)
WBC # BLD: 7.6 10*3/UL (ref 4.6–10.2)

## 2020-11-19 ENCOUNTER — TELEPHONE (OUTPATIENT)
Dept: UROLOGY | Facility: CLINIC | Age: 65
End: 2020-11-19

## 2020-11-19 NOTE — TELEPHONE ENCOUNTER
Spoke with authorization dept and she was inquiring about CPT codes for procedure, nurse confirmed CPT codes. NB      ----- Message from Janet Hoffmann sent at 11/19/2020 12:33 PM CST -----  [facility] need to speak to nurse regarding patient authorization. Call back number [phone]. Jerzys

## 2020-11-24 ENCOUNTER — OUTSIDE PLACE OF SERVICE (OUTPATIENT)
Dept: UROLOGY | Facility: CLINIC | Age: 65
End: 2020-11-24
Payer: COMMERCIAL

## 2020-11-24 LAB
GLUCOSE SERPL-MCNC: 127 MG/DL (ref 70–105)
GLUCOSE SERPL-MCNC: 150 MG/DL (ref 70–105)
GLUCOSE SERPL-MCNC: 179 MG/DL (ref 70–105)

## 2020-11-24 PROCEDURE — 38571 PR LAP,PELVIC LYMPHADENECTOMY: ICD-10-PCS | Mod: 51,,, | Performed by: SPECIALIST

## 2020-11-24 PROCEDURE — 38571 LAPAROSCOPY LYMPHADENECTOMY: CPT | Mod: 51,,, | Performed by: SPECIALIST

## 2020-11-24 PROCEDURE — 55866 PR LAP,PROSTATECTOMY,RADICAL,W/NERVE SPARE,INCL ROBOTIC: ICD-10-PCS | Mod: ,,, | Performed by: SPECIALIST

## 2020-11-24 PROCEDURE — 55866 LAPS SURG PRST8ECT RPBIC RAD: CPT | Mod: ,,, | Performed by: SPECIALIST

## 2020-11-25 LAB
ANION GAP SERPL CALC-SCNC: 9 MMOL/L (ref 3–11)
BUN SERPL-MCNC: 17 MG/DL (ref 7–18)
BUN/CREAT SERPL: 12.68 RATIO (ref 7–18)
CALCIUM SERPL-MCNC: 7.5 MG/DL (ref 8.8–10.5)
CHLORIDE SERPL-SCNC: 90 MMOL/L (ref 100–108)
CO2 SERPL-SCNC: 27 MMOL/L (ref 21–32)
CREAT SERPL-MCNC: 1.34 MG/DL (ref 0.7–1.3)
ERYTHROCYTE [DISTWIDTH] IN BLOOD BY AUTOMATED COUNT: 13.2 % (ref 12.5–18)
GFR ESTIMATION: 54
GLUCOSE SERPL-MCNC: 156 MG/DL (ref 70–105)
GLUCOSE SERPL-MCNC: 225 MG/DL (ref 70–110)
HCT VFR BLD AUTO: 29.4 % (ref 42–52)
HGB BLD-MCNC: 10.1 G/DL (ref 14–18)
MCH RBC QN AUTO: 30.7 PG (ref 27–31.2)
MCHC RBC AUTO-ENTMCNC: 34.4 G/DL (ref 31.8–35.4)
MCV RBC AUTO: 89.4 FL (ref 80–97)
NUCLEATED RED BLOOD CELLS: 0 %
PLATELETS: 176 10*3/UL (ref 142–424)
POTASSIUM SERPL-SCNC: 3.7 MMOL/L (ref 3.6–5.2)
RBC # BLD AUTO: 3.29 10*6/UL (ref 4.7–6.1)
SODIUM BLD-SCNC: 126 MMOL/L (ref 135–145)
WBC # BLD: 10.4 10*3/UL (ref 4.6–10.2)

## 2020-11-27 DIAGNOSIS — Z90.79 STATUS POST PROSTATECTOMY: Primary | ICD-10-CM

## 2020-12-01 ENCOUNTER — TELEPHONE (OUTPATIENT)
Dept: UROLOGY | Facility: CLINIC | Age: 65
End: 2020-12-01

## 2020-12-01 LAB
SPECIMEN TO PATHOLOGY: 0
SPECIMEN TO PATHOLOGY: 0
SPECIMEN TO PATHOLOGY: 1
SPECIMEN TO PATHOLOGY: 2
SPECIMEN TO PATHOLOGY: 3
SPECIMEN TO PATHOLOGY: NORMAL
SPECIMEN TO PATHOLOGY: PRESENT

## 2020-12-01 NOTE — TELEPHONE ENCOUNTER
Pt contacted clinic and was stating that he had read on his d/c paperwork that the catheter will be removed in 7-10 days and as of today it is 9 days, nurse informed pt that upon educating pt was to keep catheter in place until his return appt date which will be provided by STEVEN, pt states that he  Was not told that, he inquired about why did the hospital try to remove his drain tube, nurse verbalized that she had no idea as to the tube being removed, but nurse did verbalize that she educated him not only in person but also on the phone as well as STEVEN informing pt of the protocols that he uses with this type of procedure, pt states that he also needs paperwork filled out regarding his leave of absence, nurse verbalized that we would be able to fill out the paperwork for him, pt states that he feels out of the network of his care and that no one has called to check up on him since his surgery because he stays by himself, nurse verbalized understanding and advised him that she would inform STEVEN of his concerns, pt verbalized understanding. NB       ----- Message from Parmjit Cheatham sent at 11/30/2020  3:34 PM CST -----  Regarding: Barragan catheter issue  Please call Jose to discuss his catheter being in for 7 days [phone] (home).

## 2020-12-03 ENCOUNTER — TELEPHONE (OUTPATIENT)
Dept: UROLOGY | Facility: CLINIC | Age: 65
End: 2020-12-03

## 2020-12-03 NOTE — TELEPHONE ENCOUNTER
COVID F/U. No signs of covid. BJP    ----- Message from Moriah Waters sent at 11/30/2020 11:53 AM CST -----  Regarding: COVID FU  SURGERY-11/24/2020

## 2020-12-07 ENCOUNTER — OFFICE VISIT (OUTPATIENT)
Dept: UROLOGY | Facility: CLINIC | Age: 65
End: 2020-12-07
Payer: COMMERCIAL

## 2020-12-07 ENCOUNTER — HOSPITAL ENCOUNTER (OUTPATIENT)
Dept: RADIOLOGY | Facility: CLINIC | Age: 65
Discharge: HOME | End: 2020-12-07
Attending: NURSE PRACTITIONER
Payer: COMMERCIAL

## 2020-12-07 VITALS
HEIGHT: 70 IN | DIASTOLIC BLOOD PRESSURE: 70 MMHG | WEIGHT: 161 LBS | BODY MASS INDEX: 23.05 KG/M2 | SYSTOLIC BLOOD PRESSURE: 146 MMHG | HEART RATE: 82 BPM

## 2020-12-07 DIAGNOSIS — C61 PROSTATE CANCER: ICD-10-CM

## 2020-12-07 DIAGNOSIS — Z97.8 FOLEY CATHETER IN PLACE: ICD-10-CM

## 2020-12-07 DIAGNOSIS — Z90.79 STATUS POST PROSTATECTOMY: Primary | ICD-10-CM

## 2020-12-07 DIAGNOSIS — Z90.79 S/P PROSTATECTOMY: ICD-10-CM

## 2020-12-07 PROCEDURE — 3008F PR BODY MASS INDEX (BMI) DOCUMENTED: ICD-10-PCS | Mod: CPTII,S$GLB,, | Performed by: SPECIALIST

## 2020-12-07 PROCEDURE — 3008F BODY MASS INDEX DOCD: CPT | Mod: CPTII,S$GLB,, | Performed by: SPECIALIST

## 2020-12-07 PROCEDURE — 99024 POSTOP FOLLOW-UP VISIT: CPT | Mod: S$GLB,,, | Performed by: SPECIALIST

## 2020-12-07 PROCEDURE — 99024 PR POST-OP FOLLOW-UP VISIT: ICD-10-PCS | Mod: S$GLB,,, | Performed by: SPECIALIST

## 2020-12-07 RX ORDER — LANCETS
EACH MISCELLANEOUS
COMMUNITY
Start: 2020-12-04

## 2020-12-07 RX ORDER — HYDROCODONE BITARTRATE AND ACETAMINOPHEN 5; 325 MG/1; MG/1
TABLET ORAL
COMMUNITY
Start: 2020-11-27

## 2020-12-07 RX ORDER — SULFAMETHOXAZOLE AND TRIMETHOPRIM 800; 160 MG/1; MG/1
TABLET ORAL
COMMUNITY
Start: 2020-11-27 | End: 2022-03-15

## 2020-12-07 RX ORDER — HYOSCYAMINE SULFATE 0.12 MG/1
TABLET SUBLINGUAL
COMMUNITY
Start: 2020-11-27

## 2020-12-07 NOTE — PROGRESS NOTES
Pt presented to clinic for 16Fr catheter removal. 50Ml clear yellow urine in in catheter bag. 10cc catheter balloon deflated, catheter removed, balloon intact, patient tolerated well, no complaints voiced.

## 2020-12-07 NOTE — PROGRESS NOTES
Subjective:       Patient ID: Jose Zuleta is a 65 y.o. male.    Chief Complaint: Post-op Evaluation (cystogram)      HPI:  65-year-old man with localized prostate cancer who underwent a robotic prostatectomy with bilateral pelvic lymph node dissection 11/24/2020.    Final pathology showed negative anterior prostatic fat, 2 left-sided pelvic lymph nodes negative, 1 right-sided pelvic lymph node negative, prostatic adenocarcinoma Harsh 4+3=7 with tumor confined to the prostate prostate weight was 42 g with less than 10% involving tumor.  Surgical margins were negative.  There was no seminal vesicle invasion, no extraprostatic extension, but perineural invasion was reported.    He is here today for consideration for catheter removal as well as for drain removal.    Past Medical History:   Past Medical History:   Diagnosis Date    CAD (coronary artery disease)     Chronic pancreatitis     COPD (chronic obstructive pulmonary disease)     Diverticulosis 07/2015    DM (diabetes mellitus)     Elevated PSA     GERD (gastroesophageal reflux disease)     HTN (hypertension)     Hyperlipemia     MI (myocardial infarction)        Past Surgical Historical:   Past Surgical History:   Procedure Laterality Date    CHOLECYSTECTOMY      Heart Stents      x4    HERNIA REPAIR      PANCREAS SURGERY      PROSTATE BIOPSY  2016    PROSTATE BIOPSY  06/30/2020    SHOULDER SURGERY Left         Medications:   Medication List with Changes/Refills   Current Medications    ACCU-CHEK SOFTCLIX LANCETS MISC        AMOXICILLIN (AMOXIL) 875 MG TABLET    amoxicillin 875 mg tablet    ASPIRIN (ECOTRIN) 81 MG EC TABLET    Take 81 mg by mouth once daily.    ATORVASTATIN (LIPITOR) 80 MG TABLET        BISOPROLOL (ZEBETA) 5 MG TABLET        HYDROCODONE-ACETAMINOPHEN (NORCO) 5-325 MG PER TABLET    TAKE 1 TABLET BY MOUTH EVERY 6 HOURS AS NEEDED FOR MODERATE TO SEVERE PAIN    HYOSCYAMINE (LEVSIN/SL) 0.125 MG SUBL    PLACE 2 TABLETS UNDER  THE TONGUE EVERY 4 HOURS AS NEEDED FOR BLADDER SPASMS    LOSARTAN-HYDROCHLOROTHIAZIDE 50-12.5 MG (HYZAAR) 50-12.5 MG PER TABLET    losartan 50 mg-hydrochlorothiazide 12.5 mg tablet    METFORMIN (GLUCOPHAGE-XR) 500 MG XR 24HR TABLET        OMEPRAZOLE MAGNESIUM (PRILOSEC ORAL)    Take by mouth.    SULFAMETHOXAZOLE-TRIMETHOPRIM 800-160MG (BACTRIM DS) 800-160 MG TAB    TAKE 1 TABLET BY MOUTH TWICE A DAY *BEGIN DAY B4 CATHETER REMOVAL AND CONTINUE FOR 3 STRAIGHT DAYS        Past Social History:   Social History     Socioeconomic History    Marital status: Single     Spouse name: Not on file    Number of children: Not on file    Years of education: Not on file    Highest education level: Not on file   Occupational History    Not on file   Social Needs    Financial resource strain: Not on file    Food insecurity     Worry: Not on file     Inability: Not on file    Transportation needs     Medical: Not on file     Non-medical: Not on file   Tobacco Use    Smoking status: Current Every Day Smoker    Smokeless tobacco: Current User     Types: Chew   Substance and Sexual Activity    Alcohol use: Yes    Drug use: Not on file    Sexual activity: Not on file   Lifestyle    Physical activity     Days per week: Not on file     Minutes per session: Not on file    Stress: Not on file   Relationships    Social connections     Talks on phone: Not on file     Gets together: Not on file     Attends Mormon service: Not on file     Active member of club or organization: Not on file     Attends meetings of clubs or organizations: Not on file     Relationship status: Not on file   Other Topics Concern    Not on file   Social History Narrative    Not on file       Allergies: Review of patient's allergies indicates:  No Known Allergies     Family History:   Family History   Problem Relation Age of Onset    Coronary artery disease Other         Physical Exam:  Gen:  Alert and oriented x3; no acute distress  HEENT: NC/AT;  PERRLA, Moist mucous membranes  Chest: CTAB  CVS: RR, Normal Rate, Normal cap refills  Abd: S/NT/ND, decisions a healing.  Drain with minimal output.  :  Barragan catheter is draining clear urine    Cystogram:  A  film was obtained, about 200 cc of contrast were infused in the bladder.  Anterior posterior, lateral films and post drainage films were reviewed all of which were negative no evidence of contrast extravasation.    Assessment/Plan:       65-year-old man status post prostatectomy here for follow-up.    1.  We removed the CLARA drain today  2.  Barragan catheter was removed  3.  Kegel exercises as well as penile rehabilitation with instructed to the patient  4.  Pathology discussed with him  5.  Return to clinic in 4 weeks for the 1st PSA check.    Problem List Items Addressed This Visit        Oncology    Prostate cancer    Overview     Biopsy 06/30/2020 d/t PSA 21.1  2/12 +adenocarcinoma.  1 - GS4+3=7 8mm, 62%  2 - GS4+3=7 3mm, 27%             Other Visit Diagnoses     Status post prostatectomy    -  Primary    S/P prostatectomy        Relevant Orders    FL Cystogram Minimum 3 Views (xpd) - Non Rad Performed (Completed)    Barragan catheter in place        Relevant Orders    FL Cystogram Minimum 3 Views (xpd) - Non Rad Performed (Completed)

## 2021-01-04 ENCOUNTER — OFFICE VISIT (OUTPATIENT)
Dept: UROLOGY | Facility: CLINIC | Age: 66
End: 2021-01-04
Payer: COMMERCIAL

## 2021-01-04 VITALS
HEART RATE: 68 BPM | HEIGHT: 70 IN | BODY MASS INDEX: 23.05 KG/M2 | WEIGHT: 161 LBS | SYSTOLIC BLOOD PRESSURE: 149 MMHG | DIASTOLIC BLOOD PRESSURE: 68 MMHG

## 2021-01-04 DIAGNOSIS — Z90.79 S/P PROSTATECTOMY: Primary | ICD-10-CM

## 2021-01-04 LAB — PSA, DIAGNOSTIC: 0.02 NG/ML (ref 0–4)

## 2021-01-04 PROCEDURE — 3008F BODY MASS INDEX DOCD: CPT | Mod: CPTII,S$GLB,, | Performed by: SPECIALIST

## 2021-01-04 PROCEDURE — 99024 PR POST-OP FOLLOW-UP VISIT: ICD-10-PCS | Mod: S$GLB,,, | Performed by: SPECIALIST

## 2021-01-04 PROCEDURE — 3008F PR BODY MASS INDEX (BMI) DOCUMENTED: ICD-10-PCS | Mod: CPTII,S$GLB,, | Performed by: SPECIALIST

## 2021-01-04 PROCEDURE — 99024 POSTOP FOLLOW-UP VISIT: CPT | Mod: S$GLB,,, | Performed by: SPECIALIST

## 2021-01-05 ENCOUNTER — TELEPHONE (OUTPATIENT)
Dept: UROLOGY | Facility: CLINIC | Age: 66
End: 2021-01-05

## 2021-01-06 ENCOUNTER — TELEPHONE (OUTPATIENT)
Dept: UROLOGY | Facility: CLINIC | Age: 66
End: 2021-01-06

## 2021-03-05 ENCOUNTER — TELEPHONE (OUTPATIENT)
Dept: UROLOGY | Facility: CLINIC | Age: 66
End: 2021-03-05

## 2021-03-05 ENCOUNTER — OFFICE VISIT (OUTPATIENT)
Dept: UROLOGY | Facility: CLINIC | Age: 66
End: 2021-03-05
Payer: COMMERCIAL

## 2021-03-05 VITALS
SYSTOLIC BLOOD PRESSURE: 185 MMHG | BODY MASS INDEX: 23.62 KG/M2 | HEIGHT: 70 IN | DIASTOLIC BLOOD PRESSURE: 87 MMHG | WEIGHT: 165 LBS | HEART RATE: 81 BPM

## 2021-03-05 DIAGNOSIS — C61 PROSTATE CANCER: Primary | ICD-10-CM

## 2021-03-05 PROCEDURE — 3008F PR BODY MASS INDEX (BMI) DOCUMENTED: ICD-10-PCS | Mod: CPTII,S$GLB,, | Performed by: SPECIALIST

## 2021-03-05 PROCEDURE — 99213 OFFICE O/P EST LOW 20 MIN: CPT | Mod: S$GLB,,, | Performed by: SPECIALIST

## 2021-03-05 PROCEDURE — 3008F BODY MASS INDEX DOCD: CPT | Mod: CPTII,S$GLB,, | Performed by: SPECIALIST

## 2021-03-05 PROCEDURE — 99213 PR OFFICE/OUTPT VISIT, EST, LEVL III, 20-29 MIN: ICD-10-PCS | Mod: S$GLB,,, | Performed by: SPECIALIST

## 2021-06-04 ENCOUNTER — OFFICE VISIT (OUTPATIENT)
Dept: UROLOGY | Facility: CLINIC | Age: 66
End: 2021-06-04
Payer: COMMERCIAL

## 2021-06-04 VITALS
HEIGHT: 70 IN | DIASTOLIC BLOOD PRESSURE: 72 MMHG | WEIGHT: 165 LBS | HEART RATE: 73 BPM | BODY MASS INDEX: 23.62 KG/M2 | SYSTOLIC BLOOD PRESSURE: 161 MMHG | RESPIRATION RATE: 18 BRPM

## 2021-06-04 DIAGNOSIS — Z90.79 S/P PROSTATECTOMY: ICD-10-CM

## 2021-06-04 DIAGNOSIS — C61 PROSTATE CANCER: Primary | ICD-10-CM

## 2021-06-04 LAB — PSA, DIAGNOSTIC: <0.014 NG/ML (ref 0–4)

## 2021-06-04 PROCEDURE — 1126F AMNT PAIN NOTED NONE PRSNT: CPT | Mod: S$GLB,,, | Performed by: NURSE PRACTITIONER

## 2021-06-04 PROCEDURE — 36415 COLL VENOUS BLD VENIPUNCTURE: CPT | Mod: S$GLB,,, | Performed by: NURSE PRACTITIONER

## 2021-06-04 PROCEDURE — 3008F PR BODY MASS INDEX (BMI) DOCUMENTED: ICD-10-PCS | Mod: CPTII,S$GLB,, | Performed by: NURSE PRACTITIONER

## 2021-06-04 PROCEDURE — 3008F BODY MASS INDEX DOCD: CPT | Mod: CPTII,S$GLB,, | Performed by: NURSE PRACTITIONER

## 2021-06-04 PROCEDURE — 36415 PR COLLECTION VENOUS BLOOD,VENIPUNCTURE: ICD-10-PCS | Mod: S$GLB,,, | Performed by: NURSE PRACTITIONER

## 2021-06-04 PROCEDURE — 99213 OFFICE O/P EST LOW 20 MIN: CPT | Mod: S$GLB,,, | Performed by: NURSE PRACTITIONER

## 2021-06-04 PROCEDURE — 99213 PR OFFICE/OUTPT VISIT, EST, LEVL III, 20-29 MIN: ICD-10-PCS | Mod: S$GLB,,, | Performed by: NURSE PRACTITIONER

## 2021-06-04 PROCEDURE — 1126F PR PAIN SEVERITY QUANTIFIED, NO PAIN PRESENT: ICD-10-PCS | Mod: S$GLB,,, | Performed by: NURSE PRACTITIONER

## 2021-06-07 ENCOUNTER — TELEPHONE (OUTPATIENT)
Dept: UROLOGY | Facility: CLINIC | Age: 66
End: 2021-06-07

## 2021-09-08 ENCOUNTER — CLINICAL SUPPORT (OUTPATIENT)
Dept: UROLOGY | Facility: CLINIC | Age: 66
End: 2021-09-08
Payer: MEDICAID

## 2021-09-08 DIAGNOSIS — R97.20 ELEVATED PSA: Primary | ICD-10-CM

## 2021-09-08 LAB — PSA, DIAGNOSTIC: <0.014 NG/ML (ref 0–4)

## 2021-09-09 ENCOUNTER — TELEPHONE (OUTPATIENT)
Dept: UROLOGY | Facility: CLINIC | Age: 66
End: 2021-09-09

## 2021-09-10 ENCOUNTER — TELEPHONE (OUTPATIENT)
Dept: UROLOGY | Facility: CLINIC | Age: 66
End: 2021-09-10

## 2021-12-14 ENCOUNTER — OFFICE VISIT (OUTPATIENT)
Dept: UROLOGY | Facility: CLINIC | Age: 66
End: 2021-12-14
Payer: MEDICAID

## 2021-12-14 VITALS — DIASTOLIC BLOOD PRESSURE: 75 MMHG | HEART RATE: 74 BPM | SYSTOLIC BLOOD PRESSURE: 141 MMHG

## 2021-12-14 DIAGNOSIS — Z90.79 S/P PROSTATECTOMY: ICD-10-CM

## 2021-12-14 DIAGNOSIS — C61 PROSTATE CANCER: Primary | ICD-10-CM

## 2021-12-14 LAB — PSA, DIAGNOSTIC: <0.014 NG/ML (ref 0–4)

## 2021-12-14 PROCEDURE — 36415 COLL VENOUS BLD VENIPUNCTURE: CPT | Mod: S$GLB,,, | Performed by: NURSE PRACTITIONER

## 2021-12-14 PROCEDURE — 36415 PR COLLECTION VENOUS BLOOD,VENIPUNCTURE: ICD-10-PCS | Mod: S$GLB,,, | Performed by: NURSE PRACTITIONER

## 2021-12-14 PROCEDURE — 99214 OFFICE O/P EST MOD 30 MIN: CPT | Mod: S$GLB,,, | Performed by: UROLOGY

## 2021-12-14 PROCEDURE — 99214 PR OFFICE/OUTPT VISIT, EST, LEVL IV, 30-39 MIN: ICD-10-PCS | Mod: S$GLB,,, | Performed by: UROLOGY

## 2021-12-15 ENCOUNTER — TELEPHONE (OUTPATIENT)
Dept: UROLOGY | Facility: CLINIC | Age: 66
End: 2021-12-15

## 2022-03-15 ENCOUNTER — CLINICAL SUPPORT (OUTPATIENT)
Dept: UROLOGY | Facility: CLINIC | Age: 67
End: 2022-03-15
Payer: MEDICARE

## 2022-03-15 VITALS
HEIGHT: 70 IN | DIASTOLIC BLOOD PRESSURE: 83 MMHG | WEIGHT: 165 LBS | SYSTOLIC BLOOD PRESSURE: 143 MMHG | BODY MASS INDEX: 23.62 KG/M2 | HEART RATE: 70 BPM

## 2022-03-15 DIAGNOSIS — C61 PROSTATE CANCER: Primary | ICD-10-CM

## 2022-03-15 LAB — PSA, DIAGNOSTIC: <0.014 NG/ML (ref 0–4)

## 2022-03-16 ENCOUNTER — TELEPHONE (OUTPATIENT)
Dept: UROLOGY | Facility: CLINIC | Age: 67
End: 2022-03-16
Payer: MEDICARE

## 2022-03-16 NOTE — TELEPHONE ENCOUNTER
Left VM with results.    ----- Message from Simon Benavides MD sent at 3/16/2022  8:58 AM CDT -----  Please call patient with undetectable PSA

## 2022-06-13 ENCOUNTER — CLINICAL SUPPORT (OUTPATIENT)
Dept: UROLOGY | Facility: CLINIC | Age: 67
End: 2022-06-13
Payer: MEDICARE

## 2022-06-13 DIAGNOSIS — C61 PROSTATE CANCER: Primary | ICD-10-CM

## 2022-06-13 LAB — PSA, DIAGNOSTIC: <0.014 NG/ML (ref 0–4)

## 2022-06-16 ENCOUNTER — OFFICE VISIT (OUTPATIENT)
Dept: UROLOGY | Facility: CLINIC | Age: 67
End: 2022-06-16
Payer: MEDICARE

## 2022-06-16 VITALS — SYSTOLIC BLOOD PRESSURE: 145 MMHG | DIASTOLIC BLOOD PRESSURE: 82 MMHG | TEMPERATURE: 99 F | HEART RATE: 95 BPM

## 2022-06-16 DIAGNOSIS — C61 PROSTATE CANCER: Primary | ICD-10-CM

## 2022-06-16 NOTE — PROGRESS NOTES
Subjective:       Patient ID: Jose Zuleta is a 67 y.o. male.    Chief Complaint: Other (Psa results)      HPI:  67-year-old male with prostate cancer he had her robotic prostatectomy in November of 2020 since that time his PSA has been undetectable he is continent and doing well has no complaints is also not interested in any intervention for his erectile dysfunction    Past Medical History:   Past Medical History:   Diagnosis Date    CAD (coronary artery disease)     Chronic pancreatitis     COPD (chronic obstructive pulmonary disease)     Diverticulosis 07/2015    DM (diabetes mellitus)     Elevated PSA     GERD (gastroesophageal reflux disease)     HTN (hypertension)     Hyperlipemia     MI (myocardial infarction)        Past Surgical Historical:   Past Surgical History:   Procedure Laterality Date    CHOLECYSTECTOMY      Heart Stents      x4    HERNIA REPAIR      PANCREAS SURGERY      PROSTATE BIOPSY  2016    PROSTATE BIOPSY  06/30/2020    PROSTATECTOMY      SHOULDER SURGERY Left         Medications:   Medication List with Changes/Refills   Current Medications    ACCU-CHEK SOFTCLIX LANCETS MISC        ASPIRIN (ECOTRIN) 81 MG EC TABLET    Take 81 mg by mouth once daily.    ATORVASTATIN (LIPITOR) 80 MG TABLET        BISOPROLOL (ZEBETA) 5 MG TABLET        HYDROCODONE-ACETAMINOPHEN (NORCO) 5-325 MG PER TABLET    TAKE 1 TABLET BY MOUTH EVERY 6 HOURS AS NEEDED FOR MODERATE TO SEVERE PAIN    HYOSCYAMINE (LEVSIN/SL) 0.125 MG SUBL    PLACE 2 TABLETS UNDER THE TONGUE EVERY 4 HOURS AS NEEDED FOR BLADDER SPASMS    LOSARTAN-HYDROCHLOROTHIAZIDE 50-12.5 MG (HYZAAR) 50-12.5 MG PER TABLET    losartan 50 mg-hydrochlorothiazide 12.5 mg tablet    METFORMIN (GLUCOPHAGE-XR) 500 MG XR 24HR TABLET        OMEPRAZOLE MAGNESIUM (PRILOSEC ORAL)    Take by mouth.        Past Social History:   Social History     Socioeconomic History    Marital status: Single   Tobacco Use    Smoking status: Current Every Day Smoker      Packs/day: 1.00     Types: Cigarettes     Start date: 6/16/1965    Smokeless tobacco: Current User     Types: Chew    Tobacco comment: declines smoking cessation.   Substance and Sexual Activity    Alcohol use: Yes    Drug use: Never       Allergies: Review of patient's allergies indicates:  No Known Allergies     Family History:   Family History   Problem Relation Age of Onset    Coronary artery disease Other         Review of Systems:  Review of Systems   Constitutional: Negative for activity change and appetite change.   HENT: Negative for congestion and dental problem.    Eyes: Negative for visual disturbance.   Respiratory: Negative for chest tightness and shortness of breath.    Cardiovascular: Negative for chest pain.   Gastrointestinal: Negative for abdominal distention and abdominal pain.   Genitourinary: Negative for decreased urine volume, difficulty urinating, dysuria, enuresis, flank pain, frequency, genital sores, hematuria, penile discharge, penile pain, penile swelling, scrotal swelling, testicular pain and urgency.   Musculoskeletal: Negative for back pain and neck pain.   Skin: Negative for color change.   Neurological: Negative for dizziness.   Hematological: Negative for adenopathy.   Psychiatric/Behavioral: Negative for agitation, behavioral problems and confusion.       Physical Exam:  Physical Exam  Constitutional:       General: He is not in acute distress.     Appearance: He is well-developed.   HENT:      Head: Normocephalic and atraumatic.      Nose: Nose normal.   Eyes:      General: No scleral icterus.     Conjunctiva/sclera: Conjunctivae normal.      Pupils: Pupils are equal, round, and reactive to light.   Neck:      Thyroid: No thyromegaly.      Trachea: No tracheal deviation.   Cardiovascular:      Rate and Rhythm: Normal rate and regular rhythm.      Heart sounds: Normal heart sounds.   Pulmonary:      Effort: Pulmonary effort is normal. No respiratory distress.      Breath  sounds: Normal breath sounds. No wheezing or rales.   Abdominal:      General: Bowel sounds are normal. There is no distension.      Palpations: Abdomen is soft.      Tenderness: There is no abdominal tenderness. There is no guarding or rebound.   Genitourinary:     Penis: Normal. No tenderness.       Prostate: Normal.   Musculoskeletal:         General: No deformity. Normal range of motion.      Cervical back: Neck supple.   Lymphadenopathy:      Cervical: No cervical adenopathy.   Skin:     General: Skin is warm and dry.      Findings: No erythema or rash.   Neurological:      Mental Status: He is alert and oriented to person, place, and time.      Cranial Nerves: No cranial nerve deficit.   Psychiatric:         Behavior: Behavior normal.         Assessment/Plan:       Problem List Items Addressed This Visit        Oncology    Prostate cancer - Primary    Overview     Biopsy 06/30/2020 d/t PSA 21.1  2/12 +adenocarcinoma.  1 - GS4+3=7 8mm, 62%  2 - GS4+3=7 3mm, 27%                      Prostate cancer:  Patient is urine half out from prostatectomy his PSA is undetectable return to clinic in 6 months with PSA

## 2022-12-16 ENCOUNTER — CLINICAL SUPPORT (OUTPATIENT)
Dept: UROLOGY | Facility: CLINIC | Age: 67
End: 2022-12-16
Payer: MEDICARE

## 2022-12-16 ENCOUNTER — TELEPHONE (OUTPATIENT)
Dept: UROLOGY | Facility: CLINIC | Age: 67
End: 2022-12-16

## 2022-12-16 DIAGNOSIS — C61 PROSTATE CANCER: Primary | ICD-10-CM

## 2022-12-16 LAB — PSA, DIAGNOSTIC: <0.014 NG/ML (ref 0–4)

## 2022-12-16 NOTE — TELEPHONE ENCOUNTER
Left message for patient that PSA remains stable & undetectable & that he needs to follow up in 6 months with PSA prior to appointment. Instructed to call with any questions or concerns.     RASHEED Nolan RN                ----- Message from Sharon Diallo NP sent at 12/16/2022  2:15 PM CST -----  Patient's PSA is stable and undetectable. Patient can follow up in 6 months with PSA before his visit unless he is having any other issues.

## 2022-12-16 NOTE — PROGRESS NOTES
PSA level collected from left AC with a 22 g needle using aseptic technique. Patient tolerated well.     RASHEED Nolan RN

## 2022-12-16 NOTE — TELEPHONE ENCOUNTER
Pt scheduled for 6 month psa and f/u    ----- Message from Tay Shea sent at 12/16/2022  3:13 PM CST -----  Contact: self  Pt call and schedule pt for his 6 month labs. Pt can be reached at [phone]

## 2023-06-19 ENCOUNTER — CLINICAL SUPPORT (OUTPATIENT)
Dept: UROLOGY | Facility: CLINIC | Age: 68
End: 2023-06-19
Payer: MEDICARE

## 2023-06-19 DIAGNOSIS — C61 PROSTATE CANCER: Primary | ICD-10-CM

## 2023-06-19 LAB — PSA, DIAGNOSTIC: < 0.01 NG/ML (ref 0.1–4)

## 2023-06-19 PROCEDURE — 36415 PR COLLECTION VENOUS BLOOD,VENIPUNCTURE: ICD-10-PCS | Mod: S$GLB,,, | Performed by: UROLOGY

## 2023-06-19 PROCEDURE — 36415 COLL VENOUS BLD VENIPUNCTURE: CPT | Mod: S$GLB,,, | Performed by: UROLOGY

## 2023-06-19 NOTE — PROGRESS NOTES
PSA level collected with 22g needle from left AC using aseptic technique. Patient tolerated well.

## 2023-06-27 ENCOUNTER — OFFICE VISIT (OUTPATIENT)
Dept: UROLOGY | Facility: CLINIC | Age: 68
End: 2023-06-27
Payer: MEDICARE

## 2023-06-27 VITALS
BODY MASS INDEX: 22.9 KG/M2 | HEART RATE: 87 BPM | RESPIRATION RATE: 18 BRPM | HEIGHT: 70 IN | SYSTOLIC BLOOD PRESSURE: 177 MMHG | DIASTOLIC BLOOD PRESSURE: 84 MMHG | WEIGHT: 160 LBS

## 2023-06-27 DIAGNOSIS — Z90.79 S/P PROSTATECTOMY: Primary | ICD-10-CM

## 2023-06-27 PROCEDURE — 99214 PR OFFICE/OUTPT VISIT, EST, LEVL IV, 30-39 MIN: ICD-10-PCS | Mod: S$GLB,,, | Performed by: UROLOGY

## 2023-06-27 PROCEDURE — 99214 OFFICE O/P EST MOD 30 MIN: CPT | Mod: S$GLB,,, | Performed by: UROLOGY

## 2023-06-27 RX ORDER — LOSARTAN POTASSIUM AND HYDROCHLOROTHIAZIDE 12.5; 1 MG/1; MG/1
1 TABLET ORAL
COMMUNITY
Start: 2023-06-06

## 2023-06-27 NOTE — PROGRESS NOTES
Subjective:       Patient ID: Jose Zuleta is a 68 y.o. male.    Chief Complaint: No chief complaint on file.      HPI: 68-year-old male patient status post robotic prostatectomy in November of 2020.  Patient following up for his six-month PSA.  PSA has been undetectable since prostatectomy.  He is currently doing well and has no complaints.  He does have history of erectile dysfunction but he is not interested in any intervention at this time.       Past Medical History:   Past Medical History:   Diagnosis Date    CAD (coronary artery disease)     Chronic pancreatitis     COPD (chronic obstructive pulmonary disease)     Diverticulosis 07/2015    DM (diabetes mellitus)     Elevated PSA     GERD (gastroesophageal reflux disease)     HTN (hypertension)     Hyperlipemia     MI (myocardial infarction)        Past Surgical Historical:   Past Surgical History:   Procedure Laterality Date    CHOLECYSTECTOMY      Heart Stents      x4    HERNIA REPAIR      PANCREAS SURGERY      PROSTATE BIOPSY  2016    PROSTATE BIOPSY  06/30/2020    PROSTATECTOMY      SHOULDER SURGERY Left         Medications:   Medication List with Changes/Refills   Current Medications    ACCU-CHEK SOFTCLIX LANCETS MISC        ASPIRIN (ECOTRIN) 81 MG EC TABLET    Take 81 mg by mouth once daily.    ATORVASTATIN (LIPITOR) 80 MG TABLET        BISOPROLOL (ZEBETA) 5 MG TABLET        HYDROCODONE-ACETAMINOPHEN (NORCO) 5-325 MG PER TABLET    TAKE 1 TABLET BY MOUTH EVERY 6 HOURS AS NEEDED FOR MODERATE TO SEVERE PAIN    HYOSCYAMINE (LEVSIN/SL) 0.125 MG SUBL    PLACE 2 TABLETS UNDER THE TONGUE EVERY 4 HOURS AS NEEDED FOR BLADDER SPASMS    LOSARTAN-HYDROCHLOROTHIAZIDE 100-12.5 MG (HYZAAR) 100-12.5 MG TAB    Take 1 tablet by mouth.    METFORMIN (GLUCOPHAGE-XR) 500 MG XR 24HR TABLET        OMEPRAZOLE MAGNESIUM (PRILOSEC ORAL)    Take by mouth.   Discontinued Medications    LOSARTAN-HYDROCHLOROTHIAZIDE 50-12.5 MG (HYZAAR) 50-12.5 MG PER TABLET    losartan 50  mg-hydrochlorothiazide 12.5 mg tablet        Past Social History:   Social History     Socioeconomic History    Marital status: Single   Tobacco Use    Smoking status: Every Day     Packs/day: 1.00     Types: Cigarettes     Start date: 6/16/1965    Smokeless tobacco: Current     Types: Chew    Tobacco comments:     declines smoking cessation.   Substance and Sexual Activity    Alcohol use: Yes    Drug use: Never       Allergies: Review of patient's allergies indicates:  No Known Allergies     Family History:   Family History   Problem Relation Age of Onset    Coronary artery disease Other         Review of Systems:  Review of Systems   Constitutional: Negative.    HENT: Negative.     Eyes: Negative.    Respiratory: Negative.     Cardiovascular: Negative.    Gastrointestinal: Negative.    Endocrine: Negative.    Genitourinary: Negative.    Musculoskeletal: Negative.    Skin: Negative.    Allergic/Immunologic: Negative.    Neurological: Negative.    Hematological: Negative.    Psychiatric/Behavioral: Negative.       Physical Exam:  Physical Exam  Constitutional:       Appearance: He is normal weight.   HENT:      Head: Normocephalic.      Nose: Nose normal.      Mouth/Throat:      Mouth: Mucous membranes are moist.      Pharynx: Oropharynx is clear.   Eyes:      Extraocular Movements: Extraocular movements intact.      Conjunctiva/sclera: Conjunctivae normal.      Pupils: Pupils are equal, round, and reactive to light.   Cardiovascular:      Rate and Rhythm: Normal rate and regular rhythm.      Pulses: Normal pulses.      Heart sounds: Normal heart sounds.   Pulmonary:      Effort: Pulmonary effort is normal.      Breath sounds: Normal breath sounds.   Abdominal:      General: Abdomen is flat. Bowel sounds are normal.      Palpations: Abdomen is soft.      Hernia: There is no hernia in the right inguinal area or left inguinal area.   Genitourinary:     Penis: Normal. No phimosis, paraphimosis, hypospadias, erythema,  tenderness or discharge.       Testes: Normal.         Right: Mass, tenderness or swelling not present. Right testis is descended. Cremasteric reflex is present.          Left: Mass, tenderness or swelling not present. Left testis is descended. Cremasteric reflex is present.       Prostate: Normal.      Rectum: Normal.   Musculoskeletal:         General: Normal range of motion.      Cervical back: Normal range of motion and neck supple.   Lymphadenopathy:      Lower Body: No right inguinal adenopathy. No left inguinal adenopathy.   Skin:     General: Skin is warm and dry.      Capillary Refill: Capillary refill takes less than 2 seconds.   Neurological:      General: No focal deficit present.      Mental Status: He is alert and oriented to person, place, and time. Mental status is at baseline.   Psychiatric:         Mood and Affect: Mood normal.         Behavior: Behavior normal.         Thought Content: Thought content normal.         Judgment: Judgment normal.       Assessment/Plan:     Prostate cancer:  PSA has been undetectable since prostatectomy in 2020.  Patient doing well and has no issue at this time return to clinic in 6 months with PSA.    I, Dr. Simon Benavides have seen and personally evaluated the patient. I have formulated the plan reviewed all pertinent imaging and clinical data.  I agree with the nurse practitioner's assessment, and I have personally formulated the plan for this patient's care as described by the midlevel.  Problem List Items Addressed This Visit    None  Visit Diagnoses       S/P prostatectomy    -  Primary

## 2023-12-27 ENCOUNTER — CLINICAL SUPPORT (OUTPATIENT)
Dept: UROLOGY | Facility: CLINIC | Age: 68
End: 2023-12-27
Payer: MEDICARE

## 2023-12-27 DIAGNOSIS — C61 PROSTATE CANCER: Primary | ICD-10-CM

## 2023-12-27 LAB — PSA, DIAGNOSTIC: < 0.01 NG/ML (ref 0.1–4)

## 2023-12-27 PROCEDURE — 36415 PR COLLECTION VENOUS BLOOD,VENIPUNCTURE: ICD-10-PCS | Mod: S$GLB,,, | Performed by: UROLOGY

## 2023-12-27 PROCEDURE — 36415 COLL VENOUS BLD VENIPUNCTURE: CPT | Mod: S$GLB,,, | Performed by: UROLOGY

## 2024-01-02 ENCOUNTER — OFFICE VISIT (OUTPATIENT)
Dept: UROLOGY | Facility: CLINIC | Age: 69
End: 2024-01-02
Payer: MEDICARE

## 2024-01-02 VITALS
DIASTOLIC BLOOD PRESSURE: 88 MMHG | SYSTOLIC BLOOD PRESSURE: 163 MMHG | HEART RATE: 75 BPM | BODY MASS INDEX: 22.19 KG/M2 | WEIGHT: 155 LBS | HEIGHT: 70 IN

## 2024-01-02 DIAGNOSIS — Z90.79 S/P PROSTATECTOMY: ICD-10-CM

## 2024-01-02 DIAGNOSIS — C61 PROSTATE CANCER: Primary | ICD-10-CM

## 2024-01-02 PROCEDURE — 99214 OFFICE O/P EST MOD 30 MIN: CPT | Mod: S$GLB,,, | Performed by: UROLOGY

## 2024-01-02 NOTE — PROGRESS NOTES
Subjective:       Patient ID: Jose Zuleta is a 68 y.o. male.    Chief Complaint: No chief complaint on file.      HPI: 68-year-old male patient status post robotic prostatectomy in November of 2020 with Dr. Bishop.  PSA has been undetectable since prostatectomy.  He is currently doing well and has no complaints.  He does have history of erectile dysfunction but has not been interested in any intervention         Past Medical History:   Past Medical History:   Diagnosis Date    CAD (coronary artery disease)     Chronic pancreatitis     COPD (chronic obstructive pulmonary disease)     Diverticulosis 07/2015    DM (diabetes mellitus)     Elevated PSA     GERD (gastroesophageal reflux disease)     HTN (hypertension)     Hyperlipemia     MI (myocardial infarction)        Past Surgical Historical:   Past Surgical History:   Procedure Laterality Date    CHOLECYSTECTOMY      Heart Stents      x4    HERNIA REPAIR      PANCREAS SURGERY      PROSTATE BIOPSY  2016    PROSTATE BIOPSY  06/30/2020    PROSTATECTOMY      SHOULDER SURGERY Left         Medications:   Medication List with Changes/Refills   Current Medications    ACCU-CHEK SOFTCLIX LANCETS MISC        ASPIRIN (ECOTRIN) 81 MG EC TABLET    Take 81 mg by mouth once daily.    ATORVASTATIN (LIPITOR) 80 MG TABLET        BISOPROLOL (ZEBETA) 5 MG TABLET        HYDROCODONE-ACETAMINOPHEN (NORCO) 5-325 MG PER TABLET    TAKE 1 TABLET BY MOUTH EVERY 6 HOURS AS NEEDED FOR MODERATE TO SEVERE PAIN    HYOSCYAMINE (LEVSIN/SL) 0.125 MG SUBL    PLACE 2 TABLETS UNDER THE TONGUE EVERY 4 HOURS AS NEEDED FOR BLADDER SPASMS    LOSARTAN-HYDROCHLOROTHIAZIDE 100-12.5 MG (HYZAAR) 100-12.5 MG TAB    Take 1 tablet by mouth.    METFORMIN (GLUCOPHAGE-XR) 500 MG XR 24HR TABLET        OMEPRAZOLE MAGNESIUM (PRILOSEC ORAL)    Take by mouth.        Past Social History:   Social History     Socioeconomic History    Marital status: Single   Tobacco Use    Smoking status: Every Day     Current packs/day:  1.00     Average packs/day: 1 pack/day for 58.5 years (58.5 ttl pk-yrs)     Types: Cigarettes     Start date: 6/16/1965    Smokeless tobacco: Current     Types: Chew    Tobacco comments:     declines smoking cessation.   Substance and Sexual Activity    Alcohol use: Yes    Drug use: Never       Allergies: Review of patient's allergies indicates:  No Known Allergies     Family History:   Family History   Problem Relation Age of Onset    Coronary artery disease Other         Review of Systems:  Review of Systems   Constitutional: Negative.    HENT: Negative.     Eyes: Negative.    Respiratory: Negative.     Cardiovascular: Negative.    Gastrointestinal: Negative.    Endocrine: Negative.    Genitourinary: Negative.    Musculoskeletal: Negative.    Skin: Negative.    Allergic/Immunologic: Negative.    Neurological: Negative.    Hematological: Negative.    Psychiatric/Behavioral: Negative.         Physical Exam:  Physical Exam  Constitutional:       Appearance: He is normal weight.   HENT:      Head: Normocephalic.      Nose: Nose normal.      Mouth/Throat:      Mouth: Mucous membranes are moist.      Pharynx: Oropharynx is clear.   Eyes:      Extraocular Movements: Extraocular movements intact.      Conjunctiva/sclera: Conjunctivae normal.      Pupils: Pupils are equal, round, and reactive to light.   Cardiovascular:      Rate and Rhythm: Normal rate and regular rhythm.      Pulses: Normal pulses.      Heart sounds: Normal heart sounds.   Pulmonary:      Effort: Pulmonary effort is normal.      Breath sounds: Normal breath sounds.   Abdominal:      General: Abdomen is flat. Bowel sounds are normal.      Palpations: Abdomen is soft.      Hernia: There is no hernia in the right inguinal area or left inguinal area.   Genitourinary:     Penis: Normal. No phimosis, paraphimosis, hypospadias, erythema, tenderness or discharge.       Testes: Normal.         Right: Mass, tenderness or swelling not present. Right testis is  descended. Cremasteric reflex is present.          Left: Mass, tenderness or swelling not present. Left testis is descended. Cremasteric reflex is present.       Prostate: Normal.      Rectum: Normal.   Musculoskeletal:         General: Normal range of motion.      Cervical back: Normal range of motion and neck supple.   Lymphadenopathy:      Lower Body: No right inguinal adenopathy. No left inguinal adenopathy.   Skin:     General: Skin is warm and dry.      Capillary Refill: Capillary refill takes less than 2 seconds.   Neurological:      General: No focal deficit present.      Mental Status: He is alert and oriented to person, place, and time. Mental status is at baseline.   Psychiatric:         Mood and Affect: Mood normal.         Behavior: Behavior normal.         Thought Content: Thought content normal.         Judgment: Judgment normal.         Assessment/Plan:     Prostate cancer status post prostatectomy:  PSA has been undetectable since prostatectomy in 2020.  Patient doing well and has no issue at this time.  We will have the patient return to clinic in 1 year with a PSA.    I, Dr. Simon Benavides have seen and personally evaluated the patient. I have formulated the plan reviewed all pertinent imaging and clinical data.  I agree with the nurse practitioner's assessment, and I have personally formulated the plan for this patient's care as described by the midlevel.    Problem List Items Addressed This Visit    None

## 2025-01-06 ENCOUNTER — CLINICAL SUPPORT (OUTPATIENT)
Dept: UROLOGY | Facility: CLINIC | Age: 70
End: 2025-01-06
Payer: MEDICARE

## 2025-01-06 DIAGNOSIS — C61 PROSTATE CANCER: Primary | ICD-10-CM

## 2025-01-06 LAB — PSA, DIAGNOSTIC: < 0.01 NG/ML (ref 0.1–4)

## 2025-01-14 ENCOUNTER — OFFICE VISIT (OUTPATIENT)
Dept: UROLOGY | Facility: CLINIC | Age: 70
End: 2025-01-14
Payer: MEDICARE

## 2025-01-14 VITALS
BODY MASS INDEX: 21.19 KG/M2 | HEART RATE: 77 BPM | HEIGHT: 70 IN | DIASTOLIC BLOOD PRESSURE: 72 MMHG | SYSTOLIC BLOOD PRESSURE: 159 MMHG | WEIGHT: 148 LBS

## 2025-01-14 DIAGNOSIS — C61 PROSTATE CANCER: Primary | ICD-10-CM

## 2025-01-14 DIAGNOSIS — Z90.79 S/P PROSTATECTOMY: ICD-10-CM

## 2025-01-14 PROCEDURE — 99214 OFFICE O/P EST MOD 30 MIN: CPT | Mod: S$PBB,,, | Performed by: UROLOGY

## 2025-01-14 NOTE — PROGRESS NOTES
Subjective:       Patient ID: Jose Zuleat is a 69 y.o. male.    Chief Complaint: No chief complaint on file.      HPI: 68-year-old male patient status post robotic prostatectomy in November of 2020 with Dr. Bishop.  PSA has been undetectable since prostatectomy.  He is currently doing well and has no complaints.  He does have history of erectile dysfunction but has not been interested in any intervention         Past Medical History:   Past Medical History:   Diagnosis Date    CAD (coronary artery disease)     Chronic pancreatitis     COPD (chronic obstructive pulmonary disease)     Diverticulosis 07/2015    DM (diabetes mellitus)     Elevated PSA     GERD (gastroesophageal reflux disease)     HTN (hypertension)     Hyperlipemia     MI (myocardial infarction)        Past Surgical Historical:   Past Surgical History:   Procedure Laterality Date    CHOLECYSTECTOMY      Heart Stents      x4    HERNIA REPAIR      PANCREAS SURGERY      PROSTATE BIOPSY  2016    PROSTATE BIOPSY  06/30/2020    PROSTATECTOMY      SHOULDER SURGERY Left         Medications:   Medication List with Changes/Refills   Current Medications    ACCU-CHEK SOFTCLIX LANCETS MISC        ASPIRIN (ECOTRIN) 81 MG EC TABLET    Take 81 mg by mouth once daily.    ATORVASTATIN (LIPITOR) 80 MG TABLET        BISOPROLOL (ZEBETA) 5 MG TABLET        HYDROCODONE-ACETAMINOPHEN (NORCO) 5-325 MG PER TABLET    TAKE 1 TABLET BY MOUTH EVERY 6 HOURS AS NEEDED FOR MODERATE TO SEVERE PAIN    HYOSCYAMINE (LEVSIN/SL) 0.125 MG SUBL    PLACE 2 TABLETS UNDER THE TONGUE EVERY 4 HOURS AS NEEDED FOR BLADDER SPASMS    LOSARTAN-HYDROCHLOROTHIAZIDE 100-12.5 MG (HYZAAR) 100-12.5 MG TAB    Take 1 tablet by mouth.    METFORMIN (GLUCOPHAGE-XR) 500 MG XR 24HR TABLET        OMEPRAZOLE MAGNESIUM (PRILOSEC ORAL)    Take by mouth.        Past Social History:   Social History     Socioeconomic History    Marital status: Single   Tobacco Use    Smoking status: Every Day     Current packs/day:  1.00     Average packs/day: 1 pack/day for 59.6 years (59.6 ttl pk-yrs)     Types: Cigarettes     Start date: 6/16/1965    Smokeless tobacco: Current     Types: Chew    Tobacco comments:     declines smoking cessation.   Substance and Sexual Activity    Alcohol use: Yes    Drug use: Never       Allergies: Review of patient's allergies indicates:  No Known Allergies     Family History:   Family History   Problem Relation Name Age of Onset    Coronary artery disease Other          Review of Systems:  Review of Systems   Constitutional: Negative.    HENT: Negative.     Eyes: Negative.    Respiratory: Negative.     Cardiovascular: Negative.    Gastrointestinal: Negative.    Endocrine: Negative.    Genitourinary: Negative.    Musculoskeletal: Negative.    Skin: Negative.    Allergic/Immunologic: Negative.    Neurological: Negative.    Hematological: Negative.    Psychiatric/Behavioral: Negative.         Physical Exam:  Physical Exam  Constitutional:       Appearance: He is normal weight.   HENT:      Head: Normocephalic.      Nose: Nose normal.      Mouth/Throat:      Mouth: Mucous membranes are moist.      Pharynx: Oropharynx is clear.   Eyes:      Extraocular Movements: Extraocular movements intact.      Conjunctiva/sclera: Conjunctivae normal.      Pupils: Pupils are equal, round, and reactive to light.   Cardiovascular:      Rate and Rhythm: Normal rate and regular rhythm.      Pulses: Normal pulses.      Heart sounds: Normal heart sounds.   Pulmonary:      Effort: Pulmonary effort is normal.      Breath sounds: Normal breath sounds.   Abdominal:      General: Abdomen is flat. Bowel sounds are normal.      Palpations: Abdomen is soft.      Hernia: There is no hernia in the right inguinal area or left inguinal area.   Genitourinary:     Penis: Normal. No phimosis, paraphimosis, hypospadias, erythema, tenderness or discharge.       Testes: Normal.         Right: Mass, tenderness or swelling not present. Right testis is  descended. Cremasteric reflex is present.          Left: Mass, tenderness or swelling not present. Left testis is descended. Cremasteric reflex is present.       Prostate: Normal.      Rectum: Normal.   Musculoskeletal:         General: Normal range of motion.      Cervical back: Normal range of motion and neck supple.   Lymphadenopathy:      Lower Body: No right inguinal adenopathy. No left inguinal adenopathy.   Skin:     General: Skin is warm and dry.      Capillary Refill: Capillary refill takes less than 2 seconds.   Neurological:      General: No focal deficit present.      Mental Status: He is alert and oriented to person, place, and time. Mental status is at baseline.   Psychiatric:         Mood and Affect: Mood normal.         Behavior: Behavior normal.         Thought Content: Thought content normal.         Judgment: Judgment normal.         Assessment/Plan:     Prostate cancer status post prostatectomy:  PSA has been undetectable since prostatectomy in 2020.  Patient doing well and has no issue at this time.  We will have the patient return to clinic in 1 year with a PSA.    I, Dr. Simon Benavides have seen and personally evaluated the patient. I have formulated the plan reviewed all pertinent imaging and clinical data.  I agree with the nurse practitioner's assessment, and I have personally formulated the plan for this patient's care as described by the midlevel.    Problem List Items Addressed This Visit    None

## 2025-06-05 ENCOUNTER — TELEPHONE (OUTPATIENT)
Dept: PAIN MEDICINE | Facility: CLINIC | Age: 70
End: 2025-06-05
Payer: MEDICARE